# Patient Record
Sex: MALE | Race: WHITE | Employment: OTHER | URBAN - METROPOLITAN AREA
[De-identification: names, ages, dates, MRNs, and addresses within clinical notes are randomized per-mention and may not be internally consistent; named-entity substitution may affect disease eponyms.]

---

## 2018-03-20 ENCOUNTER — HOSPITAL ENCOUNTER (OUTPATIENT)
Age: 58
Discharge: HOME | End: 2018-03-20
Payer: MEDICARE

## 2018-03-20 ENCOUNTER — HOSPITAL ENCOUNTER (OUTPATIENT)
Dept: ULTRASOUND IMAGING | Age: 58
Discharge: HOME | End: 2018-03-20
Payer: MEDICARE

## 2018-03-20 DIAGNOSIS — K74.5 BILIARY CIRRHOSIS (HCC): ICD-10-CM

## 2018-03-20 LAB
ALBUMIN SERPL-MCNC: 4 G/DL (ref 3.5–5.2)
ALP BLD-CCNC: 128 U/L (ref 40–129)
ALT SERPL-CCNC: 26 U/L (ref 0–40)
AMMONIA: 50 UMOL/L (ref 16–60)
ANION GAP SERPL CALCULATED.3IONS-SCNC: 9 MMOL/L (ref 7–16)
AST SERPL-CCNC: 43 U/L (ref 0–39)
BASOPHILS ABSOLUTE: 0.07 E9/L (ref 0–0.2)
BASOPHILS RELATIVE PERCENT: 1 % (ref 0–2)
BILIRUB SERPL-MCNC: 1.3 MG/DL (ref 0–1.2)
BUN BLDV-MCNC: 17 MG/DL (ref 6–20)
CALCIUM SERPL-MCNC: 10.1 MG/DL (ref 8.6–10.2)
CHLORIDE BLD-SCNC: 101 MMOL/L (ref 98–107)
CHOLESTEROL, FASTING: 173 MG/DL (ref 0–199)
CO2: 31 MMOL/L (ref 22–29)
CREAT SERPL-MCNC: 0.8 MG/DL (ref 0.7–1.2)
EOSINOPHILS ABSOLUTE: 0.27 E9/L (ref 0.05–0.5)
EOSINOPHILS RELATIVE PERCENT: 3.9 % (ref 0–6)
FERRITIN: 374 NG/ML
GFR AFRICAN AMERICAN: >60
GFR NON-AFRICAN AMERICAN: >60 ML/MIN/1.73
GLUCOSE BLD-MCNC: 156 MG/DL (ref 74–109)
HBA1C MFR BLD: 6.1 % (ref 4.8–5.9)
HCT VFR BLD CALC: 45.8 % (ref 37–54)
HDLC SERPL-MCNC: 34 MG/DL
HEMOGLOBIN: 15.7 G/DL (ref 12.5–16.5)
IMMATURE GRANULOCYTES #: 0.02 E9/L
IMMATURE GRANULOCYTES %: 0.3 % (ref 0–5)
INR BLD: 1.1
IRON SATURATION: 46 % (ref 20–55)
IRON: 144 MCG/DL (ref 59–158)
LDL CHOLESTEROL CALCULATED: 98 MG/DL (ref 0–99)
LYMPHOCYTES ABSOLUTE: 1.77 E9/L (ref 1.5–4)
LYMPHOCYTES RELATIVE PERCENT: 25.3 % (ref 20–42)
MCH RBC QN AUTO: 35.6 PG (ref 26–35)
MCHC RBC AUTO-ENTMCNC: 34.3 % (ref 32–34.5)
MCV RBC AUTO: 103.9 FL (ref 80–99.9)
MICROALBUMIN UR-MCNC: <12 MG/L
MONOCYTES ABSOLUTE: 0.49 E9/L (ref 0.1–0.95)
MONOCYTES RELATIVE PERCENT: 7 % (ref 2–12)
NEUTROPHILS ABSOLUTE: 4.37 E9/L (ref 1.8–7.3)
NEUTROPHILS RELATIVE PERCENT: 62.5 % (ref 43–80)
PDW BLD-RTO: 13.1 FL (ref 11.5–15)
PLATELET # BLD: 124 E9/L (ref 130–450)
PMV BLD AUTO: 10.2 FL (ref 7–12)
POTASSIUM SERPL-SCNC: 4 MMOL/L (ref 3.5–5)
PROSTATE SPECIFIC ANTIGEN: 0.27 NG/ML (ref 0–4)
PROTHROMBIN TIME: 12.4 SEC (ref 9.3–12.4)
RBC # BLD: 4.41 E12/L (ref 3.8–5.8)
SODIUM BLD-SCNC: 141 MMOL/L (ref 132–146)
TOTAL IRON BINDING CAPACITY: 314 MCG/DL (ref 250–450)
TOTAL PROTEIN: 7.7 G/DL (ref 6.4–8.3)
TRIGLYCERIDE, FASTING: 204 MG/DL (ref 0–149)
VITAMIN B-12: 928 PG/ML (ref 211–946)
VLDLC SERPL CALC-MCNC: 41 MG/DL
WBC # BLD: 7 E9/L (ref 4.5–11.5)

## 2018-03-20 PROCEDURE — 82044 UR ALBUMIN SEMIQUANTITATIVE: CPT

## 2018-03-20 PROCEDURE — 83550 IRON BINDING TEST: CPT

## 2018-03-20 PROCEDURE — 83010 ASSAY OF HAPTOGLOBIN QUANT: CPT

## 2018-03-20 PROCEDURE — 83516 IMMUNOASSAY NONANTIBODY: CPT

## 2018-03-20 PROCEDURE — 82728 ASSAY OF FERRITIN: CPT

## 2018-03-20 PROCEDURE — G0103 PSA SCREENING: HCPCS

## 2018-03-20 PROCEDURE — 83883 ASSAY NEPHELOMETRY NOT SPEC: CPT

## 2018-03-20 PROCEDURE — 36415 COLL VENOUS BLD VENIPUNCTURE: CPT

## 2018-03-20 PROCEDURE — 82172 ASSAY OF APOLIPOPROTEIN: CPT

## 2018-03-20 PROCEDURE — 82140 ASSAY OF AMMONIA: CPT

## 2018-03-20 PROCEDURE — 83540 ASSAY OF IRON: CPT

## 2018-03-20 PROCEDURE — 85610 PROTHROMBIN TIME: CPT

## 2018-03-20 PROCEDURE — 84460 ALANINE AMINO (ALT) (SGPT): CPT

## 2018-03-20 PROCEDURE — 82247 BILIRUBIN TOTAL: CPT

## 2018-03-20 PROCEDURE — 86706 HEP B SURFACE ANTIBODY: CPT

## 2018-03-20 PROCEDURE — 83036 HEMOGLOBIN GLYCOSYLATED A1C: CPT

## 2018-03-20 PROCEDURE — 86803 HEPATITIS C AB TEST: CPT

## 2018-03-20 PROCEDURE — 82390 ASSAY OF CERULOPLASMIN: CPT

## 2018-03-20 PROCEDURE — 76705 ECHO EXAM OF ABDOMEN: CPT

## 2018-03-20 PROCEDURE — 82105 ALPHA-FETOPROTEIN SERUM: CPT

## 2018-03-20 PROCEDURE — 86703 HIV-1/HIV-2 1 RESULT ANTBDY: CPT

## 2018-03-20 PROCEDURE — 86038 ANTINUCLEAR ANTIBODIES: CPT

## 2018-03-20 PROCEDURE — 82607 VITAMIN B-12: CPT

## 2018-03-20 PROCEDURE — 82103 ALPHA-1-ANTITRYPSIN TOTAL: CPT

## 2018-03-20 PROCEDURE — 87340 HEPATITIS B SURFACE AG IA: CPT

## 2018-03-20 PROCEDURE — 86704 HEP B CORE ANTIBODY TOTAL: CPT

## 2018-03-20 PROCEDURE — 86255 FLUORESCENT ANTIBODY SCREEN: CPT

## 2018-03-20 PROCEDURE — 82977 ASSAY OF GGT: CPT

## 2018-03-20 PROCEDURE — 82784 ASSAY IGA/IGD/IGG/IGM EACH: CPT

## 2018-03-20 PROCEDURE — 80053 COMPREHEN METABOLIC PANEL: CPT

## 2018-03-20 PROCEDURE — 80061 LIPID PANEL: CPT

## 2018-03-20 PROCEDURE — 85025 COMPLETE CBC W/AUTO DIFF WBC: CPT

## 2018-03-20 PROCEDURE — 86376 MICROSOMAL ANTIBODY EACH: CPT

## 2018-03-21 LAB
ANTI-MITOCHONDRIAL AB, IFA: NEGATIVE
ANTI-NUCLEAR ANTIBODY (ANA): NEGATIVE
HBV SURFACE AB TITR SER: NORMAL {TITER}
HEPATITIS B SURFACE ANTIGEN INTERPRETATION: NORMAL
HEPATITIS C ANTIBODY INTERPRETATION: NORMAL
HIV-1 AND HIV-2 ANTIBODIES: NORMAL
IGA: 294 MG/DL (ref 70–400)
SMOOTH MUSCLE ANTIBODY: NEGATIVE

## 2018-03-22 ENCOUNTER — HOSPITAL ENCOUNTER (OUTPATIENT)
Age: 58
Setting detail: SPECIMEN
Discharge: HOME | End: 2018-03-22
Payer: MEDICARE

## 2018-03-22 LAB
APTT: 32.2 SEC (ref 24.5–35.1)
CERULOPLASMIN: 26 MG/DL (ref 15–30)
HEPATITIS B CORE TOTAL ANTIBODY: NONREACTIVE

## 2018-03-22 PROCEDURE — 85730 THROMBOPLASTIN TIME PARTIAL: CPT

## 2018-03-22 PROCEDURE — 36415 COLL VENOUS BLD VENIPUNCTURE: CPT

## 2018-03-23 LAB
AFP-TUMOR MARKER: 5 NG/ML (ref 0–9)
ALPHA-1 ANTITRYPSIN: 181 MG/DL (ref 90–200)

## 2018-03-24 LAB
GLIADIN ANTIBODIES IGA: 7 UNITS (ref 0–19)
GLIADIN ANTIBODIES IGG: 4 UNITS (ref 0–19)
TISSUE TRANSGLUTAMINASE ANTIBODY: 1 U/ML (ref 0–5)
TISSUE TRANSGLUTAMINASE IGA: 1 U/ML (ref 0–3)

## 2018-03-25 LAB — LIVER-KIDNEY MICROSOME-1 AB IGG: 1.1 U (ref 0–24.9)

## 2018-03-27 LAB
Lab: NORMAL
REPORT: NORMAL
THIS TEST SENT TO: NORMAL

## 2018-08-15 ENCOUNTER — HOSPITAL ENCOUNTER (OUTPATIENT)
Age: 58
Discharge: HOME | End: 2018-08-15
Payer: MEDICARE

## 2018-08-15 LAB
ANION GAP SERPL CALCULATED.3IONS-SCNC: 10 MMOL/L (ref 7–16)
BUN BLDV-MCNC: 15 MG/DL (ref 6–20)
CALCIUM SERPL-MCNC: 9.9 MG/DL (ref 8.6–10.2)
CHLORIDE BLD-SCNC: 101 MMOL/L (ref 98–107)
CO2: 27 MMOL/L (ref 22–29)
CREAT SERPL-MCNC: 0.8 MG/DL (ref 0.7–1.2)
GFR AFRICAN AMERICAN: >60
GFR NON-AFRICAN AMERICAN: >60 ML/MIN/1.73
GLUCOSE FASTING: 151 MG/DL (ref 74–109)
HBA1C MFR BLD: 6.2 % (ref 4–5.6)
POTASSIUM SERPL-SCNC: 4 MMOL/L (ref 3.5–5)
SODIUM BLD-SCNC: 138 MMOL/L (ref 132–146)

## 2018-08-15 PROCEDURE — 36415 COLL VENOUS BLD VENIPUNCTURE: CPT

## 2018-08-15 PROCEDURE — 83036 HEMOGLOBIN GLYCOSYLATED A1C: CPT

## 2018-08-15 PROCEDURE — 80048 BASIC METABOLIC PNL TOTAL CA: CPT

## 2018-08-26 ENCOUNTER — HOSPITAL ENCOUNTER (EMERGENCY)
Age: 58
Discharge: HOME | End: 2018-08-26
Payer: MEDICARE

## 2018-08-26 ENCOUNTER — APPOINTMENT (OUTPATIENT)
Dept: GENERAL RADIOLOGY | Age: 58
End: 2018-08-26
Payer: MEDICARE

## 2018-08-26 VITALS
HEIGHT: 71 IN | RESPIRATION RATE: 18 BRPM | BODY MASS INDEX: 33.04 KG/M2 | DIASTOLIC BLOOD PRESSURE: 76 MMHG | HEART RATE: 70 BPM | OXYGEN SATURATION: 97 % | TEMPERATURE: 97.9 F | WEIGHT: 236 LBS | SYSTOLIC BLOOD PRESSURE: 129 MMHG

## 2018-08-26 DIAGNOSIS — S80.11XA CONTUSION OF RIGHT LOWER EXTREMITY, INITIAL ENCOUNTER: ICD-10-CM

## 2018-08-26 DIAGNOSIS — S93.401A SPRAIN OF RIGHT ANKLE, UNSPECIFIED LIGAMENT, INITIAL ENCOUNTER: Primary | ICD-10-CM

## 2018-08-26 PROCEDURE — 73610 X-RAY EXAM OF ANKLE: CPT

## 2018-08-26 PROCEDURE — 99284 EMERGENCY DEPT VISIT MOD MDM: CPT

## 2018-08-26 PROCEDURE — 73590 X-RAY EXAM OF LOWER LEG: CPT

## 2018-08-26 ASSESSMENT — PAIN DESCRIPTION - ORIENTATION: ORIENTATION: RIGHT

## 2018-08-26 ASSESSMENT — PAIN DESCRIPTION - LOCATION: LOCATION: LEG

## 2018-08-26 ASSESSMENT — PAIN SCALES - GENERAL: PAINLEVEL_OUTOF10: 5

## 2018-08-26 ASSESSMENT — PAIN DESCRIPTION - PAIN TYPE: TYPE: ACUTE PAIN

## 2018-08-26 NOTE — ED PROVIDER NOTES
[facility]         Department of Emergency Medicine   ED  Provider Note  Admit Date/RoomTime: 8/26/2018  4:13 PM  ED Room: [place] [address]  Chief Complaint   Fall (Patient fell 2 days ago hurting his right leg.)    History of Present Illness   Source of history provided by:  patient. History/Exam Limitations: none. Jay Skinner is a 60 y.o. old male who has a past medical history of:   Past Medical History:   Diagnosis Date    Chronic back pain     Cirrhosis, non-alcoholic ([address]     COPD (chronic obstructive pulmonary disease) (HCC)     Emphysema of lung (HCC)     Headache     5 per month, 4/10 on the pain scale    History of blood disorder     Migraine     1 per month, 7/10 on the pain scale    Neck pain     Neuropathy     Numbness and tingling     feet and hands    Pain in both lower legs     Shaking     Stroke (cerebrum) ([address] 1997    presents to the emergency department by private vehicle, for right leg and ankle pain. Patient states he tripped and fell into the right leg two days ago. He denies head injury. No LOC. He is not on any blood thinners. Patient reports that he rolled his right ankle and has mid lower leg pain. Increased pain w/weight bearing. Nothing makes it better. ROS    Pertinent positives and negatives are stated within HPI, all other systems reviewed and are negative. Past Surgical History:   Procedure Laterality Date    [facility]   Social History:  reports that he has been smoking. He has been smoking about 0.50 packs per day. He has never used smokeless tobacco. He reports that he does not drink alcohol or use drugs. Family History: family history includes Alzheimer's Disease in his mother; Arthritis in his father; Breast Cancer in his mother; Diabetes in his father; Heart Disease in his father.    Allergies: Chocolate; Grass extracts [gramineae pollens]; and Carafate [sucralfate]    Physical Exam           ED care and counseling regarding the diagnosis and prognosis. Questions are answered at this time and they are agreeable with the plan. Assessment      1. Sprain of right ankle, unspecified ligament, initial encounter    2. Contusion of right lower extremity, initial encounter      Plan   Discharge to home  Patient condition is good    New Medications     New Prescriptions    No medications on file     Electronically signed by FRANCISCO Manrique   DD: 8/26/18  **This report was transcribed using voice recognition software. Every effort was made to ensure accuracy; however, inadvertent computerized transcription errors may be present. END OF ED PROVIDER NOTE       Jack Manrique  08/26/18 1734    ATTENDING PROVIDER ATTESTATION:     Supervising Physician, on-site, available for consultation, non-participatory in the evaluation or care of this patient.          Jose G Deluca DO  08/26/18 4147

## 2018-10-05 ENCOUNTER — HOSPITAL ENCOUNTER (OUTPATIENT)
Age: 58
Discharge: HOME | End: 2018-10-07
Payer: MEDICARE

## 2018-10-05 LAB
ALBUMIN SERPL-MCNC: 3.7 G/DL (ref 3.5–5.2)
ALP BLD-CCNC: 178 U/L (ref 40–129)
ALT SERPL-CCNC: 23 U/L (ref 0–40)
ANION GAP SERPL CALCULATED.3IONS-SCNC: 10 MMOL/L (ref 7–16)
APTT: 33.7 SEC (ref 24.5–35.1)
AST SERPL-CCNC: 43 U/L (ref 0–39)
BASOPHILS ABSOLUTE: 0.07 E9/L (ref 0–0.2)
BASOPHILS RELATIVE PERCENT: 1.1 % (ref 0–2)
BILIRUB SERPL-MCNC: 1 MG/DL (ref 0–1.2)
BUN BLDV-MCNC: 12 MG/DL (ref 6–20)
CALCIUM SERPL-MCNC: 9.5 MG/DL (ref 8.6–10.2)
CHLORIDE BLD-SCNC: 106 MMOL/L (ref 98–107)
CHOLESTEROL, TOTAL: 157 MG/DL (ref 0–199)
CO2: 26 MMOL/L (ref 22–29)
CREAT SERPL-MCNC: 0.7 MG/DL (ref 0.7–1.2)
EOSINOPHILS ABSOLUTE: 0.3 E9/L (ref 0.05–0.5)
EOSINOPHILS RELATIVE PERCENT: 4.5 % (ref 0–6)
GFR AFRICAN AMERICAN: >60
GFR NON-AFRICAN AMERICAN: >60 ML/MIN/1.73
GLUCOSE BLD-MCNC: 167 MG/DL (ref 74–109)
HBA1C MFR BLD: 6.1 % (ref 4–5.6)
HCT VFR BLD CALC: 43.5 % (ref 37–54)
HDLC SERPL-MCNC: 56 MG/DL
HEMOGLOBIN: 14.2 G/DL (ref 12.5–16.5)
IMMATURE GRANULOCYTES #: 0.02 E9/L
IMMATURE GRANULOCYTES %: 0.3 % (ref 0–5)
INR BLD: 1.1
LDL CHOLESTEROL CALCULATED: 77 MG/DL (ref 0–99)
LYMPHOCYTES ABSOLUTE: 1.13 E9/L (ref 1.5–4)
LYMPHOCYTES RELATIVE PERCENT: 17 % (ref 20–42)
MCH RBC QN AUTO: 34.5 PG (ref 26–35)
MCHC RBC AUTO-ENTMCNC: 32.6 % (ref 32–34.5)
MCV RBC AUTO: 105.6 FL (ref 80–99.9)
MICROALBUMIN UR-MCNC: <12 MG/L
MONOCYTES ABSOLUTE: 0.52 E9/L (ref 0.1–0.95)
MONOCYTES RELATIVE PERCENT: 7.8 % (ref 2–12)
NEUTROPHILS ABSOLUTE: 4.59 E9/L (ref 1.8–7.3)
NEUTROPHILS RELATIVE PERCENT: 69.3 % (ref 43–80)
PDW BLD-RTO: 13.5 FL (ref 11.5–15)
PLATELET # BLD: 122 E9/L (ref 130–450)
PMV BLD AUTO: 10.9 FL (ref 7–12)
POTASSIUM SERPL-SCNC: 4.3 MMOL/L (ref 3.5–5)
PROTHROMBIN TIME: 12.2 SEC (ref 9.3–12.4)
RBC # BLD: 4.12 E12/L (ref 3.8–5.8)
SODIUM BLD-SCNC: 142 MMOL/L (ref 132–146)
TOTAL PROTEIN: 6.9 G/DL (ref 6.4–8.3)
TRIGL SERPL-MCNC: 121 MG/DL (ref 0–149)
VLDLC SERPL CALC-MCNC: 24 MG/DL
WBC # BLD: 6.6 E9/L (ref 4.5–11.5)

## 2018-10-05 PROCEDURE — 82044 UR ALBUMIN SEMIQUANTITATIVE: CPT

## 2018-10-05 PROCEDURE — 82105 ALPHA-FETOPROTEIN SERUM: CPT

## 2018-10-05 PROCEDURE — 85730 THROMBOPLASTIN TIME PARTIAL: CPT

## 2018-10-05 PROCEDURE — 36415 COLL VENOUS BLD VENIPUNCTURE: CPT

## 2018-10-05 PROCEDURE — 80053 COMPREHEN METABOLIC PANEL: CPT

## 2018-10-05 PROCEDURE — 85025 COMPLETE CBC W/AUTO DIFF WBC: CPT

## 2018-10-05 PROCEDURE — 80061 LIPID PANEL: CPT

## 2018-10-05 PROCEDURE — 85610 PROTHROMBIN TIME: CPT

## 2018-10-05 PROCEDURE — 83036 HEMOGLOBIN GLYCOSYLATED A1C: CPT

## 2018-10-08 LAB — AFP-TUMOR MARKER: 4 NG/ML (ref 0–9)

## 2018-10-25 ENCOUNTER — HOSPITAL ENCOUNTER (OUTPATIENT)
Age: 58
Discharge: HOME | End: 2018-10-27
Payer: MEDICARE

## 2018-10-25 LAB
FOLATE: 16.4 NG/ML (ref 4.8–24.2)
TSH SERPL DL<=0.05 MIU/L-ACNC: 1.71 UIU/ML (ref 0.27–4.2)
VITAMIN B-12: 1155 PG/ML (ref 211–946)

## 2018-10-25 PROCEDURE — 82607 VITAMIN B-12: CPT

## 2018-10-25 PROCEDURE — 84443 ASSAY THYROID STIM HORMONE: CPT

## 2018-10-25 PROCEDURE — 36415 COLL VENOUS BLD VENIPUNCTURE: CPT

## 2018-10-25 PROCEDURE — 82746 ASSAY OF FOLIC ACID SERUM: CPT

## 2018-12-05 ENCOUNTER — HOSPITAL ENCOUNTER (OUTPATIENT)
Age: 58
Discharge: HOME | End: 2018-12-07
Payer: MEDICARE

## 2018-12-05 LAB
ALBUMIN SERPL-MCNC: 3.5 G/DL (ref 3.5–5.2)
ALP BLD-CCNC: 144 U/L (ref 40–129)
ALT SERPL-CCNC: 22 U/L (ref 0–40)
ANION GAP SERPL CALCULATED.3IONS-SCNC: 13 MMOL/L (ref 7–16)
AST SERPL-CCNC: 40 U/L (ref 0–39)
BILIRUB SERPL-MCNC: 1.5 MG/DL (ref 0–1.2)
BUN BLDV-MCNC: 11 MG/DL (ref 6–20)
CALCIUM SERPL-MCNC: 9.6 MG/DL (ref 8.6–10.2)
CHLORIDE BLD-SCNC: 100 MMOL/L (ref 98–107)
CO2: 27 MMOL/L (ref 22–29)
CREAT SERPL-MCNC: 0.9 MG/DL (ref 0.7–1.2)
GFR AFRICAN AMERICAN: >60
GFR NON-AFRICAN AMERICAN: >60 ML/MIN/1.73
GLUCOSE BLD-MCNC: 246 MG/DL (ref 74–99)
HCT VFR BLD CALC: 42.7 % (ref 37–54)
HEMOGLOBIN: 14.3 G/DL (ref 12.5–16.5)
MCH RBC QN AUTO: 35 PG (ref 26–35)
MCHC RBC AUTO-ENTMCNC: 33.5 % (ref 32–34.5)
MCV RBC AUTO: 104.4 FL (ref 80–99.9)
PDW BLD-RTO: 13.6 FL (ref 11.5–15)
PLATELET # BLD: 110 E9/L (ref 130–450)
PMV BLD AUTO: 11.3 FL (ref 7–12)
POTASSIUM SERPL-SCNC: 4 MMOL/L (ref 3.5–5)
RBC # BLD: 4.09 E12/L (ref 3.8–5.8)
SODIUM BLD-SCNC: 140 MMOL/L (ref 132–146)
TOTAL PROTEIN: 6.8 G/DL (ref 6.4–8.3)
WBC # BLD: 5.3 E9/L (ref 4.5–11.5)

## 2018-12-05 PROCEDURE — 85027 COMPLETE CBC AUTOMATED: CPT

## 2018-12-05 PROCEDURE — 80053 COMPREHEN METABOLIC PANEL: CPT

## 2018-12-05 PROCEDURE — 36415 COLL VENOUS BLD VENIPUNCTURE: CPT

## 2019-03-22 ENCOUNTER — HOSPITAL ENCOUNTER (OUTPATIENT)
Age: 59
Discharge: HOME | End: 2019-03-24
Payer: MEDICARE

## 2019-03-22 LAB
ALBUMIN SERPL-MCNC: 4 G/DL (ref 3.5–5.2)
ALP BLD-CCNC: 119 U/L (ref 40–129)
ALT SERPL-CCNC: 18 U/L (ref 0–40)
ANION GAP SERPL CALCULATED.3IONS-SCNC: 12 MMOL/L (ref 7–16)
APTT: 34 SEC (ref 24.5–35.1)
AST SERPL-CCNC: 41 U/L (ref 0–39)
BASOPHILS ABSOLUTE: 0.08 E9/L (ref 0–0.2)
BASOPHILS RELATIVE PERCENT: 1 % (ref 0–2)
BILIRUB SERPL-MCNC: 1.7 MG/DL (ref 0–1.2)
BUN BLDV-MCNC: 14 MG/DL (ref 6–20)
CALCIUM SERPL-MCNC: 9.9 MG/DL (ref 8.6–10.2)
CHLORIDE BLD-SCNC: 101 MMOL/L (ref 98–107)
CHOLESTEROL, TOTAL: 163 MG/DL (ref 0–199)
CO2: 27 MMOL/L (ref 22–29)
CREAT SERPL-MCNC: 0.9 MG/DL (ref 0.7–1.2)
EOSINOPHILS ABSOLUTE: 0.26 E9/L (ref 0.05–0.5)
EOSINOPHILS RELATIVE PERCENT: 3.2 % (ref 0–6)
GFR AFRICAN AMERICAN: >60
GFR NON-AFRICAN AMERICAN: >60 ML/MIN/1.73
GLUCOSE BLD-MCNC: 147 MG/DL (ref 74–99)
HBA1C MFR BLD: 6.2 % (ref 4–5.6)
HCT VFR BLD CALC: 43.6 % (ref 37–54)
HDLC SERPL-MCNC: 41 MG/DL
HEMOGLOBIN: 14.6 G/DL (ref 12.5–16.5)
IMMATURE GRANULOCYTES #: 0.03 E9/L
IMMATURE GRANULOCYTES %: 0.4 % (ref 0–5)
INR BLD: 1.1
LDL CHOLESTEROL CALCULATED: 91 MG/DL (ref 0–99)
LYMPHOCYTES ABSOLUTE: 1.31 E9/L (ref 1.5–4)
LYMPHOCYTES RELATIVE PERCENT: 16.2 % (ref 20–42)
MCH RBC QN AUTO: 35.1 PG (ref 26–35)
MCHC RBC AUTO-ENTMCNC: 33.5 % (ref 32–34.5)
MCV RBC AUTO: 104.8 FL (ref 80–99.9)
MICROALBUMIN UR-MCNC: <12 MG/L
MONOCYTES ABSOLUTE: 0.53 E9/L (ref 0.1–0.95)
MONOCYTES RELATIVE PERCENT: 6.6 % (ref 2–12)
NEUTROPHILS ABSOLUTE: 5.88 E9/L (ref 1.8–7.3)
NEUTROPHILS RELATIVE PERCENT: 72.6 % (ref 43–80)
PDW BLD-RTO: 13.7 FL (ref 11.5–15)
PLATELET # BLD: 107 E9/L (ref 130–450)
PMV BLD AUTO: 10.7 FL (ref 7–12)
POTASSIUM SERPL-SCNC: 4.2 MMOL/L (ref 3.5–5)
PROTHROMBIN TIME: 12.5 SEC (ref 9.3–12.4)
RBC # BLD: 4.16 E12/L (ref 3.8–5.8)
SODIUM BLD-SCNC: 140 MMOL/L (ref 132–146)
TOTAL PROTEIN: 7.3 G/DL (ref 6.4–8.3)
TRIGL SERPL-MCNC: 156 MG/DL (ref 0–149)
VLDLC SERPL CALC-MCNC: 31 MG/DL
WBC # BLD: 8.1 E9/L (ref 4.5–11.5)

## 2019-03-22 PROCEDURE — 36415 COLL VENOUS BLD VENIPUNCTURE: CPT

## 2019-03-22 PROCEDURE — 85730 THROMBOPLASTIN TIME PARTIAL: CPT

## 2019-03-22 PROCEDURE — 85025 COMPLETE CBC W/AUTO DIFF WBC: CPT

## 2019-03-22 PROCEDURE — 83036 HEMOGLOBIN GLYCOSYLATED A1C: CPT

## 2019-03-22 PROCEDURE — 82044 UR ALBUMIN SEMIQUANTITATIVE: CPT

## 2019-03-22 PROCEDURE — 80053 COMPREHEN METABOLIC PANEL: CPT

## 2019-03-22 PROCEDURE — 85610 PROTHROMBIN TIME: CPT

## 2019-03-22 PROCEDURE — 80061 LIPID PANEL: CPT

## 2019-04-03 ENCOUNTER — HOSPITAL ENCOUNTER (OUTPATIENT)
Age: 59
Discharge: HOME | End: 2019-04-05
Payer: MEDICARE

## 2019-04-10 ENCOUNTER — HOSPITAL ENCOUNTER (OUTPATIENT)
Age: 59
Discharge: HOME | End: 2019-04-12
Payer: MEDICARE

## 2019-04-10 PROCEDURE — 89055 LEUKOCYTE ASSESSMENT FECAL: CPT

## 2019-04-10 PROCEDURE — 87329 GIARDIA AG IA: CPT

## 2019-04-10 PROCEDURE — 87328 CRYPTOSPORIDIUM AG IA: CPT

## 2019-04-10 PROCEDURE — 87045 FECES CULTURE AEROBIC BACT: CPT

## 2019-04-10 PROCEDURE — 83520 IMMUNOASSAY QUANT NOS NONAB: CPT

## 2019-04-12 LAB
CRYPTOSPORIDIUM ANTIGEN STOOL: NORMAL
GIARDIA ANTIGEN STOOL: NORMAL
WHITE BLOOD CELLS (WBC), STOOL: NORMAL

## 2019-04-13 LAB — CULTURE, STOOL: NORMAL

## 2019-04-17 LAB
Lab: NORMAL
REPORT: NORMAL
THIS TEST SENT TO: NORMAL

## 2019-05-10 ENCOUNTER — HOSPITAL ENCOUNTER (OUTPATIENT)
Age: 59
Discharge: HOME | End: 2019-05-12
Payer: MEDICARE

## 2019-05-10 LAB
ALBUMIN SERPL-MCNC: 4 G/DL (ref 3.5–5.2)
ALP BLD-CCNC: 129 U/L (ref 40–129)
ALT SERPL-CCNC: 21 U/L (ref 0–40)
ANION GAP SERPL CALCULATED.3IONS-SCNC: 13 MMOL/L (ref 7–16)
AST SERPL-CCNC: 39 U/L (ref 0–39)
BILIRUB SERPL-MCNC: 1.1 MG/DL (ref 0–1.2)
BUN BLDV-MCNC: 11 MG/DL (ref 6–20)
CALCIUM SERPL-MCNC: 9.9 MG/DL (ref 8.6–10.2)
CHLORIDE BLD-SCNC: 104 MMOL/L (ref 98–107)
CO2: 27 MMOL/L (ref 22–29)
CREAT SERPL-MCNC: 0.9 MG/DL (ref 0.7–1.2)
GFR AFRICAN AMERICAN: >60
GFR NON-AFRICAN AMERICAN: >60 ML/MIN/1.73
GLUCOSE BLD-MCNC: 149 MG/DL (ref 74–99)
POTASSIUM SERPL-SCNC: 4.7 MMOL/L (ref 3.5–5)
SODIUM BLD-SCNC: 144 MMOL/L (ref 132–146)
TOTAL PROTEIN: 7.2 G/DL (ref 6.4–8.3)

## 2019-05-10 PROCEDURE — 36415 COLL VENOUS BLD VENIPUNCTURE: CPT

## 2019-05-10 PROCEDURE — 80053 COMPREHEN METABOLIC PANEL: CPT

## 2019-08-29 ENCOUNTER — HOSPITAL ENCOUNTER (OUTPATIENT)
Age: 59
Discharge: HOME | End: 2019-08-31
Payer: MEDICARE

## 2019-08-29 LAB
ALBUMIN SERPL-MCNC: 3.7 G/DL (ref 3.5–5.2)
ALP BLD-CCNC: 136 U/L (ref 40–129)
ALT SERPL-CCNC: 23 U/L (ref 0–40)
AMMONIA: 33 UMOL/L (ref 16–60)
AMYLASE: 71 U/L (ref 20–100)
ANION GAP SERPL CALCULATED.3IONS-SCNC: 11 MMOL/L (ref 7–16)
APTT: 35.2 SEC (ref 24.5–35.1)
AST SERPL-CCNC: 52 U/L (ref 0–39)
BASOPHILS ABSOLUTE: 0.09 E9/L (ref 0–0.2)
BASOPHILS RELATIVE PERCENT: 1.5 % (ref 0–2)
BILIRUB SERPL-MCNC: 0.9 MG/DL (ref 0–1.2)
BUN BLDV-MCNC: 12 MG/DL (ref 8–23)
CALCIUM SERPL-MCNC: 9.5 MG/DL (ref 8.6–10.2)
CHLORIDE BLD-SCNC: 103 MMOL/L (ref 98–107)
CO2: 27 MMOL/L (ref 22–29)
CREAT SERPL-MCNC: 1 MG/DL (ref 0.7–1.2)
EOSINOPHILS ABSOLUTE: 0.24 E9/L (ref 0.05–0.5)
EOSINOPHILS RELATIVE PERCENT: 4.1 % (ref 0–6)
GFR AFRICAN AMERICAN: >60
GFR NON-AFRICAN AMERICAN: >60 ML/MIN/1.73
GLUCOSE BLD-MCNC: 115 MG/DL (ref 74–99)
HBA1C MFR BLD: 5.6 % (ref 4–5.6)
HCT VFR BLD CALC: 43.9 % (ref 37–54)
HEMOGLOBIN: 14.2 G/DL (ref 12.5–16.5)
IMMATURE GRANULOCYTES #: 0.01 E9/L
IMMATURE GRANULOCYTES %: 0.2 % (ref 0–5)
INR BLD: 1.1
LIPASE: 35 U/L (ref 13–60)
LYMPHOCYTES ABSOLUTE: 1.14 E9/L (ref 1.5–4)
LYMPHOCYTES RELATIVE PERCENT: 19.6 % (ref 20–42)
MCH RBC QN AUTO: 34.7 PG (ref 26–35)
MCHC RBC AUTO-ENTMCNC: 32.3 % (ref 32–34.5)
MCV RBC AUTO: 107.3 FL (ref 80–99.9)
MONOCYTES ABSOLUTE: 0.59 E9/L (ref 0.1–0.95)
MONOCYTES RELATIVE PERCENT: 10.2 % (ref 2–12)
NEUTROPHILS ABSOLUTE: 3.74 E9/L (ref 1.8–7.3)
NEUTROPHILS RELATIVE PERCENT: 64.4 % (ref 43–80)
PDW BLD-RTO: 13.5 FL (ref 11.5–15)
PLATELET # BLD: 113 E9/L (ref 130–450)
PMV BLD AUTO: 10.1 FL (ref 7–12)
POTASSIUM SERPL-SCNC: 4.5 MMOL/L (ref 3.5–5)
PROTHROMBIN TIME: 12.4 SEC (ref 9.3–12.4)
RBC # BLD: 4.09 E12/L (ref 3.8–5.8)
SODIUM BLD-SCNC: 141 MMOL/L (ref 132–146)
T4 FREE: 0.89 NG/DL (ref 0.93–1.7)
TOTAL PROTEIN: 6.9 G/DL (ref 6.4–8.3)
TSH SERPL DL<=0.05 MIU/L-ACNC: 1.47 UIU/ML (ref 0.27–4.2)
WBC # BLD: 5.8 E9/L (ref 4.5–11.5)

## 2019-08-29 PROCEDURE — 36415 COLL VENOUS BLD VENIPUNCTURE: CPT

## 2019-08-29 PROCEDURE — 85025 COMPLETE CBC W/AUTO DIFF WBC: CPT

## 2019-08-29 PROCEDURE — 85730 THROMBOPLASTIN TIME PARTIAL: CPT

## 2019-08-29 PROCEDURE — 83036 HEMOGLOBIN GLYCOSYLATED A1C: CPT

## 2019-08-29 PROCEDURE — 83690 ASSAY OF LIPASE: CPT

## 2019-08-29 PROCEDURE — 85610 PROTHROMBIN TIME: CPT

## 2019-08-29 PROCEDURE — 82140 ASSAY OF AMMONIA: CPT

## 2019-08-29 PROCEDURE — 82150 ASSAY OF AMYLASE: CPT

## 2019-08-29 PROCEDURE — 80053 COMPREHEN METABOLIC PANEL: CPT

## 2019-08-29 PROCEDURE — 84439 ASSAY OF FREE THYROXINE: CPT

## 2019-08-29 PROCEDURE — 84443 ASSAY THYROID STIM HORMONE: CPT

## 2019-08-29 PROCEDURE — 82105 ALPHA-FETOPROTEIN SERUM: CPT

## 2019-08-31 LAB — AFP-TUMOR MARKER: 5 NG/ML (ref 0–9)

## 2019-12-05 ENCOUNTER — HOSPITAL ENCOUNTER (OUTPATIENT)
Age: 59
Discharge: HOME | End: 2019-12-07
Payer: MEDICARE

## 2019-12-05 LAB
ALBUMIN SERPL-MCNC: 3.5 G/DL (ref 3.5–5.2)
ALP BLD-CCNC: 153 U/L (ref 40–129)
ALT SERPL-CCNC: 27 U/L (ref 0–40)
ANION GAP SERPL CALCULATED.3IONS-SCNC: 16 MMOL/L (ref 7–16)
AST SERPL-CCNC: 52 U/L (ref 0–39)
BILIRUB SERPL-MCNC: 1.7 MG/DL (ref 0–1.2)
BUN BLDV-MCNC: 11 MG/DL (ref 8–23)
CALCIUM SERPL-MCNC: 9.4 MG/DL (ref 8.6–10.2)
CHLORIDE BLD-SCNC: 106 MMOL/L (ref 98–107)
CHOLESTEROL, TOTAL: 192 MG/DL (ref 0–199)
CO2: 21 MMOL/L (ref 22–29)
CREAT SERPL-MCNC: 1 MG/DL (ref 0.7–1.2)
GFR AFRICAN AMERICAN: >60
GFR NON-AFRICAN AMERICAN: >60 ML/MIN/1.73
GLUCOSE BLD-MCNC: 118 MG/DL (ref 74–99)
HBA1C MFR BLD: 5.7 % (ref 4–5.6)
HDLC SERPL-MCNC: 53 MG/DL
LDL CHOLESTEROL CALCULATED: 117 MG/DL (ref 0–99)
POTASSIUM SERPL-SCNC: 4.5 MMOL/L (ref 3.5–5)
PROSTATE SPECIFIC ANTIGEN: 0.18 NG/ML (ref 0–4)
SODIUM BLD-SCNC: 143 MMOL/L (ref 132–146)
TOTAL PROTEIN: 7 G/DL (ref 6.4–8.3)
TRIGL SERPL-MCNC: 108 MG/DL (ref 0–149)
VLDLC SERPL CALC-MCNC: 22 MG/DL

## 2019-12-05 PROCEDURE — 80061 LIPID PANEL: CPT

## 2019-12-05 PROCEDURE — G0103 PSA SCREENING: HCPCS

## 2019-12-05 PROCEDURE — 36415 COLL VENOUS BLD VENIPUNCTURE: CPT

## 2019-12-05 PROCEDURE — 83036 HEMOGLOBIN GLYCOSYLATED A1C: CPT

## 2019-12-05 PROCEDURE — 80053 COMPREHEN METABOLIC PANEL: CPT

## 2020-01-01 LAB
ALBUMIN SERPL-MCNC: 3.2 G/DL (ref 3.5–5.2)
ALP BLD-CCNC: 199 U/L (ref 40–129)
ALT SERPL-CCNC: 32 U/L (ref 0–40)
ANION GAP SERPL CALCULATED.3IONS-SCNC: 14 MMOL/L (ref 7–16)
ANISOCYTOSIS: ABNORMAL
AST SERPL-CCNC: 66 U/L (ref 0–39)
BASOPHILS ABSOLUTE: 0 E9/L (ref 0–0.2)
BASOPHILS RELATIVE PERCENT: 1 % (ref 0–2)
BILIRUB SERPL-MCNC: 1.8 MG/DL (ref 0–1.2)
BUN BLDV-MCNC: 19 MG/DL (ref 8–23)
CALCIUM SERPL-MCNC: 9.7 MG/DL (ref 8.6–10.2)
CHLORIDE BLD-SCNC: 105 MMOL/L (ref 98–107)
CHOLESTEROL, FASTING: 179 MG/DL (ref 0–199)
CO2: 26 MMOL/L (ref 22–29)
CREAT SERPL-MCNC: 1.2 MG/DL (ref 0.7–1.2)
EOSINOPHILS ABSOLUTE: 0.31 E9/L (ref 0.05–0.5)
EOSINOPHILS RELATIVE PERCENT: 4.3 % (ref 0–6)
FOLATE: 7.1 NG/ML (ref 4.8–24.2)
GFR AFRICAN AMERICAN: >60
GFR NON-AFRICAN AMERICAN: >60 ML/MIN/1.73
GLUCOSE BLD-MCNC: 136 MG/DL (ref 74–99)
HBA1C MFR BLD: 6.2 % (ref 4–5.6)
HCT VFR BLD CALC: 39.6 % (ref 37–54)
HDLC SERPL-MCNC: 46 MG/DL
HEMOGLOBIN: 13.2 G/DL (ref 12.5–16.5)
LDL CHOLESTEROL CALCULATED: 107 MG/DL (ref 0–99)
LYMPHOCYTES ABSOLUTE: 0.71 E9/L (ref 1.5–4)
LYMPHOCYTES RELATIVE PERCENT: 10.4 % (ref 20–42)
MCH RBC QN AUTO: 37.1 PG (ref 26–35)
MCHC RBC AUTO-ENTMCNC: 33.3 % (ref 32–34.5)
MCV RBC AUTO: 111.2 FL (ref 80–99.9)
MICROALBUMIN UR-MCNC: 16.7 MG/L
MONOCYTES ABSOLUTE: 0.5 E9/L (ref 0.1–0.95)
MONOCYTES RELATIVE PERCENT: 7 % (ref 2–12)
NEUTROPHILS ABSOLUTE: 5.54 E9/L (ref 1.8–7.3)
NEUTROPHILS RELATIVE PERCENT: 78.3 % (ref 43–80)
NUCLEATED RED BLOOD CELLS: 0.9 /100 WBC
PDW BLD-RTO: 14.1 FL (ref 11.5–15)
PLATELET # BLD: 96 E9/L (ref 130–450)
PLATELET CONFIRMATION: NORMAL
PMV BLD AUTO: 11.5 FL (ref 7–12)
POTASSIUM SERPL-SCNC: 4.6 MMOL/L (ref 3.5–5)
RBC # BLD: 3.56 E12/L (ref 3.8–5.8)
RPR: NORMAL
SEDIMENTATION RATE, ERYTHROCYTE: 50 MM/HR (ref 0–15)
SODIUM BLD-SCNC: 145 MMOL/L (ref 132–146)
TOTAL PROTEIN: 6.6 G/DL (ref 6.4–8.3)
TRIGLYCERIDE, FASTING: 130 MG/DL (ref 0–149)
TSH SERPL DL<=0.05 MIU/L-ACNC: 1.95 UIU/ML (ref 0.27–4.2)
VITAMIN B-12: 1261 PG/ML (ref 211–946)
VLDLC SERPL CALC-MCNC: 26 MG/DL
WBC # BLD: 7.1 E9/L (ref 4.5–11.5)

## 2020-03-23 ENCOUNTER — HOSPITAL ENCOUNTER (EMERGENCY)
Age: 60
Discharge: HOME | End: 2020-03-23
Payer: MEDICARE

## 2020-03-23 ENCOUNTER — APPOINTMENT (OUTPATIENT)
Dept: GENERAL RADIOLOGY | Age: 60
End: 2020-03-23
Payer: MEDICARE

## 2020-03-23 VITALS
HEIGHT: 72 IN | WEIGHT: 240 LBS | SYSTOLIC BLOOD PRESSURE: 127 MMHG | RESPIRATION RATE: 16 BRPM | OXYGEN SATURATION: 96 % | DIASTOLIC BLOOD PRESSURE: 73 MMHG | TEMPERATURE: 98.9 F | HEART RATE: 84 BPM | BODY MASS INDEX: 32.51 KG/M2

## 2020-03-23 PROCEDURE — 71046 X-RAY EXAM CHEST 2 VIEWS: CPT

## 2020-03-23 PROCEDURE — 6370000000 HC RX 637 (ALT 250 FOR IP): Performed by: NURSE PRACTITIONER

## 2020-03-23 PROCEDURE — 99284 EMERGENCY DEPT VISIT MOD MDM: CPT

## 2020-03-23 RX ORDER — ALBUTEROL SULFATE 90 UG/1
2 AEROSOL, METERED RESPIRATORY (INHALATION) ONCE
Status: COMPLETED | OUTPATIENT
Start: 2020-03-23 | End: 2020-03-23

## 2020-03-23 RX ORDER — GUAIFENESIN/DEXTROMETHORPHAN 100-10MG/5
5 SYRUP ORAL 3 TIMES DAILY PRN
Qty: 120 ML | Refills: 0 | Status: SHIPPED | OUTPATIENT
Start: 2020-03-23 | End: 2020-04-02

## 2020-03-23 RX ORDER — METHYLPREDNISOLONE 4 MG/1
TABLET ORAL
Qty: 1 KIT | Refills: 0 | Status: SHIPPED | OUTPATIENT
Start: 2020-03-23 | End: 2020-03-29

## 2020-03-23 RX ORDER — PREDNISONE 20 MG/1
60 TABLET ORAL ONCE
Status: COMPLETED | OUTPATIENT
Start: 2020-03-23 | End: 2020-03-23

## 2020-03-23 RX ORDER — DOXYCYCLINE HYCLATE 100 MG
100 TABLET ORAL 2 TIMES DAILY
Qty: 20 TABLET | Refills: 0 | Status: SHIPPED | OUTPATIENT
Start: 2020-03-23 | End: 2020-04-02

## 2020-03-23 RX ADMIN — ALBUTEROL SULFATE 2 PUFF: 90 AEROSOL, METERED RESPIRATORY (INHALATION) at 16:21

## 2020-03-23 RX ADMIN — PREDNISONE 60 MG: 20 TABLET ORAL at 16:23

## 2020-03-23 NOTE — ED PROVIDER NOTES
[facility]     Department of Emergency Medicine   ED  Provider Note  Admit Date/RoomTime: 3/23/2020  1:57 PM  ED Room: [address] [place] [address]   Chief Complaint   URI (for 17 days, HA, runny jennifer, cough, fever, chills, SOB); Shortness of Breath; and Fever    History of Present Illness   Source of history provided by:  patient. History/Exam Limitations: none. Leotha Kayser is a 60 y.o. old male who has a past medical history of:   Past Medical History:   Diagnosis Date    Chronic back pain     [address], non-alcoholic ([facility]     COPD (chronic obstructive pulmonary disease) (HCC)     Emphysema of lung (HCC)     Headache     5 per month, 4/10 on the pain scale    History of blood disorder     Migraine     1 per month, 7/10 on the pain scale    Neck pain     Neuropathy     Numbness and tingling     feet and hands    Pain in both lower legs     Shaking     Stroke (cerebrum) ([facility] 1997    presents to the emergency department by private vehicle, with complaints of gradual onset productive cough with sputum described as green which began 3 week(s) prior to arrival.  The symptoms are associated with fever and bodyaches and denies hemoptysis abdominal pain, chest pain or leg swelling. He has prior history of COPD in the past and is a smoker. Since onset the symptoms have been stable and mild-moderate in severity. The symptoms are aggravated by exertion and relieved by nothing. He denies any recent travel or known sick contacts. ROS   Pertinent positives and negatives are stated within HPI, all other systems reviewed and are negative. Past Surgical History:   Procedure Laterality Date    [facility]   Social History:  reports that he has been smoking. He has been smoking about 0.50 packs per day. He has never used smokeless tobacco. He reports that he does not drink alcohol or use drugs.   Family History: family history includes Alzheimer's Disease in his mother; well-appearing, afebrile. Not hypoxic. Presents with cough for 3 weeks with production of green sputum. Chest x-ray obtained and negative for acute infiltrate. Influenza A or B, respiratory viruses or Unable to test for Covid-19 due to national shortage  related to Covid -19 pandemic. Therefore will start steroids, doxycycline for presumed COPD exacerbation, as well as cough medication. Patient instructed to self quarantine for 14 days from the onset of symptoms, educated on infection control practices. Follow-up with PCP for recheck. Return to ED for any new or worsening symptoms. Counseling: The emergency provider has spoken with the patient and discussed todays results, in addition to providing specific details for the plan of care and counseling regarding the diagnosis and prognosis. Questions are answered at this time and they are agreeable with the plan. Assessment      1. COPD exacerbation ([facility]      Plan   Discharge to home  Patient condition is good    New Medications     Discharge Medication List as of 3/23/2020  3:46 PM      START taking these medications    Details   doxycycline hyclate (VIBRA-TABS) 100 MG tablet Take 1 tablet by mouth 2 times daily for 10 days, Disp-20 tablet, R-0Print      methylPREDNISolone (MEDROL, CASIMIRO,) 4 MG tablet Take by mouth., Disp-1 kit, R-0Print      guaiFENesin-dextromethorphan (ROBITUSSIN DM) 100-10 MG/5ML syrup Take 5 mLs by mouth 3 times daily as needed for Cough, Disp-120 mL, R-0Print           Electronically signed by DARWIN Cadet CNP   DD: 3/23/20  **This report was transcribed using voice recognition software. Every effort was made to ensure accuracy; however, inadvertent computerized transcription errors may be present.   END OF ED PROVIDER NOTE      DARWIN Champagne CNP  03/23/20 9037

## 2020-05-16 ENCOUNTER — APPOINTMENT (OUTPATIENT)
Dept: GENERAL RADIOLOGY | Age: 60
End: 2020-05-16
Payer: MEDICARE

## 2020-05-16 ENCOUNTER — HOSPITAL ENCOUNTER (EMERGENCY)
Age: 60
Discharge: HOME | End: 2020-05-16
Attending: EMERGENCY MEDICINE
Payer: MEDICARE

## 2020-05-16 VITALS
OXYGEN SATURATION: 97 % | DIASTOLIC BLOOD PRESSURE: 88 MMHG | TEMPERATURE: 98.6 F | HEART RATE: 82 BPM | SYSTOLIC BLOOD PRESSURE: 130 MMHG | RESPIRATION RATE: 18 BRPM

## 2020-05-16 LAB
ALBUMIN SERPL-MCNC: 3.4 G/DL (ref 3.5–5.2)
ALP BLD-CCNC: 221 U/L (ref 40–129)
ALT SERPL-CCNC: 31 U/L (ref 0–40)
ANION GAP SERPL CALCULATED.3IONS-SCNC: 10 MMOL/L (ref 7–16)
ANISOCYTOSIS: ABNORMAL
AST SERPL-CCNC: 64 U/L (ref 0–39)
BASOPHILS ABSOLUTE: 0.06 E9/L (ref 0–0.2)
BASOPHILS RELATIVE PERCENT: 1 % (ref 0–2)
BILIRUB SERPL-MCNC: 1.7 MG/DL (ref 0–1.2)
BUN BLDV-MCNC: 13 MG/DL (ref 8–23)
CALCIUM SERPL-MCNC: 9.3 MG/DL (ref 8.6–10.2)
CHLORIDE BLD-SCNC: 104 MMOL/L (ref 98–107)
CO2: 26 MMOL/L (ref 22–29)
CREAT SERPL-MCNC: 1 MG/DL (ref 0.7–1.2)
EOSINOPHILS ABSOLUTE: 0.23 E9/L (ref 0.05–0.5)
EOSINOPHILS RELATIVE PERCENT: 4 % (ref 0–6)
GFR AFRICAN AMERICAN: >60
GFR NON-AFRICAN AMERICAN: >60 ML/MIN/1.73
GLUCOSE BLD-MCNC: 174 MG/DL (ref 74–99)
HCT VFR BLD CALC: 41.1 % (ref 37–54)
HEMOGLOBIN: 13.6 G/DL (ref 12.5–16.5)
IMMATURE GRANULOCYTES #: 0.02 E9/L
IMMATURE GRANULOCYTES %: 0.3 % (ref 0–5)
LYMPHOCYTES ABSOLUTE: 1.27 E9/L (ref 1.5–4)
LYMPHOCYTES RELATIVE PERCENT: 21.9 % (ref 20–42)
MCH RBC QN AUTO: 35 PG (ref 26–35)
MCHC RBC AUTO-ENTMCNC: 33.1 % (ref 32–34.5)
MCV RBC AUTO: 105.7 FL (ref 80–99.9)
MONOCYTES ABSOLUTE: 0.55 E9/L (ref 0.1–0.95)
MONOCYTES RELATIVE PERCENT: 9.5 % (ref 2–12)
NEUTROPHILS ABSOLUTE: 3.66 E9/L (ref 1.8–7.3)
NEUTROPHILS RELATIVE PERCENT: 63.3 % (ref 43–80)
PDW BLD-RTO: 15.1 FL (ref 11.5–15)
PLATELET # BLD: 95 E9/L (ref 130–450)
PLATELET CONFIRMATION: NORMAL
PMV BLD AUTO: 10.2 FL (ref 7–12)
POTASSIUM REFLEX MAGNESIUM: 3.9 MMOL/L (ref 3.5–5)
PRO-BNP: 69 PG/ML (ref 0–125)
RBC # BLD: 3.89 E12/L (ref 3.8–5.8)
SODIUM BLD-SCNC: 140 MMOL/L (ref 132–146)
TOTAL PROTEIN: 6.5 G/DL (ref 6.4–8.3)
TROPONIN: <0.01 NG/ML (ref 0–0.03)
WBC # BLD: 5.8 E9/L (ref 4.5–11.5)

## 2020-05-16 PROCEDURE — 93005 ELECTROCARDIOGRAM TRACING: CPT | Performed by: PHYSICIAN ASSISTANT

## 2020-05-16 PROCEDURE — 84484 ASSAY OF TROPONIN QUANT: CPT

## 2020-05-16 PROCEDURE — 85025 COMPLETE CBC W/AUTO DIFF WBC: CPT

## 2020-05-16 PROCEDURE — 99285 EMERGENCY DEPT VISIT HI MDM: CPT

## 2020-05-16 PROCEDURE — 71045 X-RAY EXAM CHEST 1 VIEW: CPT

## 2020-05-16 PROCEDURE — 80053 COMPREHEN METABOLIC PANEL: CPT

## 2020-05-16 PROCEDURE — 83880 ASSAY OF NATRIURETIC PEPTIDE: CPT

## 2020-05-16 PROCEDURE — U0003 INFECTIOUS AGENT DETECTION BY NUCLEIC ACID (DNA OR RNA); SEVERE ACUTE RESPIRATORY SYNDROME CORONAVIRUS 2 (SARS-COV-2) (CORONAVIRUS DISEASE [COVID-19]), AMPLIFIED PROBE TECHNIQUE, MAKING USE OF HIGH THROUGHPUT TECHNOLOGIES AS DESCRIBED BY CMS-2020-01-R: HCPCS

## 2020-05-16 NOTE — ED PROVIDER NOTES
ED Attending  CC: No    HPI:  5/16/20,   Time: 2:48 PM EDT       Brenda Tierney is a 60 y.o. male presenting to the ED for cough, beginning 2 months ago. The complaint has been intermittent, mild in severity, and worsened by nothing. Patient reports that he was evaluated in the emergency department at the end of March for cough and congestion. He states at that time COVID testing was not available but he did self quarantine for 2 weeks. He has been afebrile at home without sick contacts or recent travel. Reports intermittent chest discomfort and shortness of breath as well for last 2 months. Denies any recent change in activity, medication, or diet. Patient denies all other symptoms at this time. Review of Systems:   Pertinent positives and negatives are stated within HPI, all other systems reviewed and are negative.          --------------------------------------------- PAST HISTORY ---------------------------------------------  Past Medical History:  has a past medical history of Chronic back pain, Cirrhosis, non-alcoholic ([facility] COPD (chronic obstructive pulmonary disease) ([facility] [address] [facility] [facility] Headache, History of blood disorder, Migraine, Neck pain, Neuropathy, Numbness and tingling, Pain in both lower legs, Shaking, and Stroke [facility]rebrum) [facility] Past Surgical History:  has a past surgical history that includes Cholecystectomy (04/2015). Social History:  reports that he has been smoking. He has been smoking about 0.50 packs per day. He has never used smokeless tobacco. He reports that he does not drink alcohol or use drugs. Family History: family history includes Alzheimer's Disease in his mother; Arthritis in his father; Breast Cancer in his mother; Diabetes in his father; Heart Disease in his father. The patients home medications have been reviewed.     Allergies: Chocolate; Grass extracts [gramineae pollens]; and Carafate [sucralfate]        ---------------------------------------------------PHYSICAL EXAM--------------------------------------    Constitutional/General: Alert and oriented x3, well appearing, non toxic in NAD  Head: Normocephalic and atraumatic  Eyes: PERRL, EOMI, conjunctive normal, sclera non icteric  Mouth: Oropharynx clear, handling secretions, no trismus, no asymmetry of the posterior oropharynx or uvular edema  Neck: Supple, full ROM, non tender to palpation in the midline, no stridor, no crepitus, no meningeal signs  Respiratory: Lungs clear to auscultation bilaterally, no wheezes, rales, or rhonchi. Not in respiratory distress  Cardiovascular:  Regular rate. Regular rhythm. No murmurs, gallops, or rubs. 2+ distal pulses  Chest: No chest wall tenderness  GI:  Abdomen Soft, Non tender, Non distended. +BS. No organomegaly, no palpable masses,  No rebound, guarding, or rigidity. Musculoskeletal: Moves all extremities x 4. Warm and well perfused, no clubbing, cyanosis, or edema. Capillary refill <3 seconds  Integument: skin warm and dry. No rashes. Lymphatic: no lymphadenopathy noted  Neurologic: GCS 15, no focal deficits, symmetric strength 5/5 in the upper and lower extremities bilaterally  Psychiatric: Normal Affect    -------------------------------------------------- RESULTS -------------------------------------------------  I have personally reviewed all laboratory and imaging results for this patient. Results are listed below.      LABS:  Results for orders placed or performed during the hospital encounter of 05/16/20   CBC Auto Differential   Result Value Ref Range    WBC 5.8 4.5 - 11.5 E9/L    RBC 3.89 3.80 - 5.80 E12/L    Hemoglobin 13.6 12.5 - 16.5 g/dL    Hematocrit 41.1 37.0 - 54.0 %    .7 (H) 80.0 - 99.9 fL    MCH 35.0 26.0 - 35.0 pg    MCHC 33.1 32.0 - 34.5 %    RDW 15.1 (H) 11.5 - 15.0 fL    Platelets 95 (L) 323 - 450 E9/L    MPV 10.2 7.0 - 12.0 fL    Neutrophils % 63.3 43.0 - 80.0 % Interpretation: Normal    The patients available past medical records and past encounters were reviewed. ------------------------------ ED COURSE/MEDICAL DECISION MAKING----------------------  Medications - No data to display      ED COURSE:       Medical Decision Making:    Patient is a 57-year-old male presenting emergency department for concerns for COVID-19 due to cough for the last 6 weeks. Vitals have remained stable in the emergency department, he has been asymptomatic in the ED, and lab work is reassuring. Recommended close follow-up with PCP for recheck return the emergency department any new or worsening symptoms. Patient was given outpatient COVID testing in the ED today. Patient voiced understanding is agreeable above treatment plan. This patient's ED course included: a personal history and physicial examination    This patient has remained hemodynamically stable during their ED course. Re-Evaluations:             Re-evaluation. Patients symptoms are improving    Re-examination  5/16/20   2:48 PM EDT          Vital Signs:   Vitals:    05/16/20 1409 05/16/20 1422 05/16/20 1737   BP:  130/76 130/88   Pulse: 101 87 82   Resp:  16 18   Temp:  98.6 °F (37 °C)    SpO2: 95% 96% 97%     Card/Pulm:  Rhythm: normal rate. Heart Sounds: Normal S1, S2 and no murmurs, gallops, or rubs. clear to auscultation, no wheezes or rales and unlabored breathing. Capillary Refill: normal.  Radial Pulse:  present 2+. Skin:  Dry, Pink and Warm. Counseling: The emergency provider has spoken with the patient and discussed todays results, in addition to providing specific details for the plan of care and counseling regarding the diagnosis and prognosis. Questions are answered at this time and they are agreeable with the plan.       --------------------------------- IMPRESSION AND DISPOSITION ---------------------------------    IMPRESSION  1.  Acute upper respiratory infection DISPOSITION  Disposition: Discharge to home  Patient condition is stable    NOTE: This report was transcribed using voice recognition software.  Every effort was made to ensure accuracy; however, inadvertent computerized transcription errors may be present        Jack Jenkins  05/16/20 [id number]

## 2020-05-17 LAB
EKG ATRIAL RATE: 79 BPM
EKG P AXIS: 83 DEGREES
EKG P-R INTERVAL: 134 MS
EKG Q-T INTERVAL: 434 MS
EKG QRS DURATION: 98 MS
EKG QTC CALCULATION (BAZETT): 497 MS
EKG R AXIS: 67 DEGREES
EKG T AXIS: 10 DEGREES
EKG VENTRICULAR RATE: 79 BPM

## 2020-05-17 PROCEDURE — 93010 ELECTROCARDIOGRAM REPORT: CPT | Performed by: INTERNAL MEDICINE

## 2020-05-18 ENCOUNTER — CARE COORDINATION (OUTPATIENT)
Dept: CARE COORDINATION | Age: 60
End: 2020-05-18

## 2020-05-18 LAB
SARS-COV-2: NOT DETECTED
SOURCE: NORMAL

## 2020-05-18 NOTE — CARE COORDINATION
tabletops, doorknobs, bathroom fixtures, toilets, phones, keyboards, tablets, and bedside tables. Also, clean any surfaces that may have blood, stool, or body fluids on them. Use a household cleaning spray or wipe, according to the label instructions. Labels contain instructions for safe and effective use of the cleaning product including precautions you should take when applying the product, such as wearing gloves and making sure you have good ventilation during use of the product. Monitor your symptoms  Seek prompt medical attention if your illness is worsening (e.g., difficulty breathing). Before seeking care, call your healthcare provider and tell them that you have, or are being evaluated for, COVID-19. Put on a facemask before you enter the facility. These steps will help the healthcare provider's office to keep other people in the office or waiting room from getting infected or exposed. Ask your healthcare provider to call the local or [facility] health department. Persons who are placed under If you have a medical emergency and need to call 911, notify the dispatch personnel that you have, or are being evaluated for COVID-19. If possible, put on a facemask before emergency medical services arrive. The patient agrees to contact the [facility] exposure line [phone], local health department [place] Department of Health: ([phone]) and PCP office for questions related to their healthcare. Author provided contact information for future reference.     Patient/family/caregiver given information for [employer] and agrees to enroll no

## 2020-06-03 ENCOUNTER — CARE COORDINATION (OUTPATIENT)
Dept: CARE COORDINATION | Age: 60
End: 2020-06-03

## 2020-06-09 ENCOUNTER — HOSPITAL ENCOUNTER (OUTPATIENT)
Age: 60
Discharge: HOME | End: 2020-06-11
Payer: MEDICARE

## 2020-06-09 LAB
ANISOCYTOSIS: ABNORMAL
BASOPHILS ABSOLUTE: 0.06 E9/L (ref 0–0.2)
BASOPHILS RELATIVE PERCENT: 0.9 % (ref 0–2)
BURR CELLS: ABNORMAL
EOSINOPHILS ABSOLUTE: 0.21 E9/L (ref 0.05–0.5)
EOSINOPHILS RELATIVE PERCENT: 3.2 % (ref 0–6)
HCT VFR BLD CALC: 41.2 % (ref 37–54)
HEMOGLOBIN: 13.3 G/DL (ref 12.5–16.5)
IMMATURE GRANULOCYTES #: 0.02 E9/L
IMMATURE GRANULOCYTES %: 0.3 % (ref 0–5)
LYMPHOCYTES ABSOLUTE: 1.31 E9/L (ref 1.5–4)
LYMPHOCYTES RELATIVE PERCENT: 19.8 % (ref 20–42)
MCH RBC QN AUTO: 35.6 PG (ref 26–35)
MCHC RBC AUTO-ENTMCNC: 32.3 % (ref 32–34.5)
MCV RBC AUTO: 110.2 FL (ref 80–99.9)
MONOCYTES ABSOLUTE: 0.5 E9/L (ref 0.1–0.95)
MONOCYTES RELATIVE PERCENT: 7.5 % (ref 2–12)
NEUTROPHILS ABSOLUTE: 4.53 E9/L (ref 1.8–7.3)
NEUTROPHILS RELATIVE PERCENT: 68.3 % (ref 43–80)
PDW BLD-RTO: 15.2 FL (ref 11.5–15)
PLATELET # BLD: 96 E9/L (ref 130–450)
PLATELET CONFIRMATION: NORMAL
PMV BLD AUTO: 10.7 FL (ref 7–12)
POIKILOCYTES: ABNORMAL
POLYCHROMASIA: ABNORMAL
RBC # BLD: 3.74 E12/L (ref 3.8–5.8)
WBC # BLD: 6.6 E9/L (ref 4.5–11.5)

## 2020-06-09 PROCEDURE — 36415 COLL VENOUS BLD VENIPUNCTURE: CPT

## 2020-06-09 PROCEDURE — 80053 COMPREHEN METABOLIC PANEL: CPT

## 2020-06-09 PROCEDURE — 80061 LIPID PANEL: CPT

## 2020-06-09 PROCEDURE — 83036 HEMOGLOBIN GLYCOSYLATED A1C: CPT

## 2020-06-09 PROCEDURE — 85025 COMPLETE CBC W/AUTO DIFF WBC: CPT

## 2020-06-10 LAB
ALBUMIN SERPL-MCNC: 3.4 G/DL (ref 3.5–5.2)
ALP BLD-CCNC: 200 U/L (ref 40–129)
ALT SERPL-CCNC: 36 U/L (ref 0–40)
ANION GAP SERPL CALCULATED.3IONS-SCNC: 15 MMOL/L (ref 7–16)
AST SERPL-CCNC: 73 U/L (ref 0–39)
BILIRUB SERPL-MCNC: 1.8 MG/DL (ref 0–1.2)
BUN BLDV-MCNC: 13 MG/DL (ref 8–23)
CALCIUM SERPL-MCNC: 9.4 MG/DL (ref 8.6–10.2)
CHLORIDE BLD-SCNC: 104 MMOL/L (ref 98–107)
CHOLESTEROL, TOTAL: 185 MG/DL (ref 0–199)
CO2: 23 MMOL/L (ref 22–29)
CREAT SERPL-MCNC: 1 MG/DL (ref 0.7–1.2)
GFR AFRICAN AMERICAN: >60
GFR NON-AFRICAN AMERICAN: >60 ML/MIN/1.73
GLUCOSE BLD-MCNC: 133 MG/DL (ref 74–99)
HBA1C MFR BLD: 6.1 % (ref 4–5.6)
HDLC SERPL-MCNC: 52 MG/DL
LDL CHOLESTEROL CALCULATED: 110 MG/DL (ref 0–99)
POTASSIUM SERPL-SCNC: 4.5 MMOL/L (ref 3.5–5)
SODIUM BLD-SCNC: 142 MMOL/L (ref 132–146)
TOTAL PROTEIN: 6.8 G/DL (ref 6.4–8.3)
TRIGL SERPL-MCNC: 114 MG/DL (ref 0–149)
VLDLC SERPL CALC-MCNC: 23 MG/DL

## 2020-06-11 LAB — MICROALBUMIN UR-MCNC: <12 MG/L

## 2020-06-11 PROCEDURE — 82044 UR ALBUMIN SEMIQUANTITATIVE: CPT

## 2020-06-30 ENCOUNTER — APPOINTMENT (OUTPATIENT)
Dept: GENERAL RADIOLOGY | Age: 60
End: 2020-06-30
Payer: MEDICARE

## 2020-06-30 ENCOUNTER — HOSPITAL ENCOUNTER (EMERGENCY)
Age: 60
Discharge: HOME | End: 2020-06-30
Attending: EMERGENCY MEDICINE
Payer: MEDICARE

## 2020-06-30 ENCOUNTER — APPOINTMENT (OUTPATIENT)
Dept: ULTRASOUND IMAGING | Age: 60
End: 2020-06-30
Payer: MEDICARE

## 2020-06-30 VITALS
TEMPERATURE: 98.7 F | WEIGHT: 266 LBS | OXYGEN SATURATION: 98 % | BODY MASS INDEX: 36.03 KG/M2 | HEART RATE: 87 BPM | RESPIRATION RATE: 18 BRPM | HEIGHT: 72 IN | DIASTOLIC BLOOD PRESSURE: 83 MMHG | SYSTOLIC BLOOD PRESSURE: 131 MMHG

## 2020-06-30 LAB
ALBUMIN SERPL-MCNC: 3.4 G/DL (ref 3.5–5.2)
ALP BLD-CCNC: 173 U/L (ref 40–129)
ALT SERPL-CCNC: 36 U/L (ref 0–40)
ANION GAP SERPL CALCULATED.3IONS-SCNC: 8 MMOL/L (ref 7–16)
AST SERPL-CCNC: 72 U/L (ref 0–39)
BASOPHILS ABSOLUTE: 0.05 E9/L (ref 0–0.2)
BASOPHILS RELATIVE PERCENT: 1 % (ref 0–2)
BILIRUB SERPL-MCNC: 1.6 MG/DL (ref 0–1.2)
BUN BLDV-MCNC: 14 MG/DL (ref 8–23)
CALCIUM SERPL-MCNC: 9.4 MG/DL (ref 8.6–10.2)
CHLORIDE BLD-SCNC: 106 MMOL/L (ref 98–107)
CO2: 28 MMOL/L (ref 22–29)
CREAT SERPL-MCNC: 1 MG/DL (ref 0.7–1.2)
EOSINOPHILS ABSOLUTE: 0.11 E9/L (ref 0.05–0.5)
EOSINOPHILS RELATIVE PERCENT: 2.1 % (ref 0–6)
GFR AFRICAN AMERICAN: >60
GFR NON-AFRICAN AMERICAN: >60 ML/MIN/1.73
GLUCOSE BLD-MCNC: 172 MG/DL (ref 74–99)
HCT VFR BLD CALC: 40 % (ref 37–54)
HEMOGLOBIN: 13.3 G/DL (ref 12.5–16.5)
IMMATURE GRANULOCYTES #: 0.01 E9/L
IMMATURE GRANULOCYTES %: 0.2 % (ref 0–5)
LYMPHOCYTES ABSOLUTE: 0.97 E9/L (ref 1.5–4)
LYMPHOCYTES RELATIVE PERCENT: 18.8 % (ref 20–42)
MCH RBC QN AUTO: 35.7 PG (ref 26–35)
MCHC RBC AUTO-ENTMCNC: 33.3 % (ref 32–34.5)
MCV RBC AUTO: 107.2 FL (ref 80–99.9)
MONOCYTES ABSOLUTE: 0.47 E9/L (ref 0.1–0.95)
MONOCYTES RELATIVE PERCENT: 9.1 % (ref 2–12)
NEUTROPHILS ABSOLUTE: 3.56 E9/L (ref 1.8–7.3)
NEUTROPHILS RELATIVE PERCENT: 68.8 % (ref 43–80)
PDW BLD-RTO: 14.5 FL (ref 11.5–15)
PLATELET # BLD: 94 E9/L (ref 130–450)
PLATELET CONFIRMATION: NORMAL
PMV BLD AUTO: 10.1 FL (ref 7–12)
POTASSIUM SERPL-SCNC: 4 MMOL/L (ref 3.5–5)
PRO-BNP: 66 PG/ML (ref 0–125)
RBC # BLD: 3.73 E12/L (ref 3.8–5.8)
SODIUM BLD-SCNC: 142 MMOL/L (ref 132–146)
TOTAL PROTEIN: 6.5 G/DL (ref 6.4–8.3)
TROPONIN: <0.01 NG/ML (ref 0–0.03)
WBC # BLD: 5.2 E9/L (ref 4.5–11.5)

## 2020-06-30 PROCEDURE — 71045 X-RAY EXAM CHEST 1 VIEW: CPT

## 2020-06-30 PROCEDURE — 83880 ASSAY OF NATRIURETIC PEPTIDE: CPT

## 2020-06-30 PROCEDURE — 99284 EMERGENCY DEPT VISIT MOD MDM: CPT

## 2020-06-30 PROCEDURE — 84484 ASSAY OF TROPONIN QUANT: CPT

## 2020-06-30 PROCEDURE — 80053 COMPREHEN METABOLIC PANEL: CPT

## 2020-06-30 PROCEDURE — 93005 ELECTROCARDIOGRAM TRACING: CPT | Performed by: EMERGENCY MEDICINE

## 2020-06-30 PROCEDURE — 93970 EXTREMITY STUDY: CPT

## 2020-06-30 PROCEDURE — 96374 THER/PROPH/DIAG INJ IV PUSH: CPT

## 2020-06-30 PROCEDURE — 6360000002 HC RX W HCPCS: Performed by: EMERGENCY MEDICINE

## 2020-06-30 PROCEDURE — 85025 COMPLETE CBC W/AUTO DIFF WBC: CPT

## 2020-06-30 RX ORDER — FUROSEMIDE 10 MG/ML
20 INJECTION INTRAMUSCULAR; INTRAVENOUS ONCE
Status: COMPLETED | OUTPATIENT
Start: 2020-06-30 | End: 2020-06-30

## 2020-06-30 RX ADMIN — FUROSEMIDE 20 MG: 20 INJECTION, SOLUTION INTRAMUSCULAR; INTRAVENOUS at 22:22

## 2020-06-30 NOTE — ED NOTES
12 lead ekg given to dr. Leena Butler and no new orders received at this time.       Zac Dan RN  06/30/20 4086

## 2020-07-01 LAB
EKG ATRIAL RATE: 87 BPM
EKG P AXIS: 84 DEGREES
EKG P-R INTERVAL: 136 MS
EKG Q-T INTERVAL: 418 MS
EKG QRS DURATION: 100 MS
EKG QTC CALCULATION (BAZETT): 502 MS
EKG R AXIS: 51 DEGREES
EKG T AXIS: 43 DEGREES
EKG VENTRICULAR RATE: 87 BPM

## 2020-07-01 PROCEDURE — 93010 ELECTROCARDIOGRAM REPORT: CPT | Performed by: INTERNAL MEDICINE

## 2020-07-14 ENCOUNTER — HOSPITAL ENCOUNTER (OUTPATIENT)
Age: 60
Discharge: HOME | End: 2020-07-16
Payer: MEDICARE

## 2020-07-14 LAB
ALBUMIN SERPL-MCNC: 3.4 G/DL (ref 3.5–5.2)
ALP BLD-CCNC: 215 U/L (ref 40–129)
ALT SERPL-CCNC: 38 U/L (ref 0–40)
ANION GAP SERPL CALCULATED.3IONS-SCNC: 13 MMOL/L (ref 7–16)
APTT: 34.1 SEC (ref 24.5–35.1)
AST SERPL-CCNC: 77 U/L (ref 0–39)
BASOPHILS ABSOLUTE: 0.06 E9/L (ref 0–0.2)
BASOPHILS RELATIVE PERCENT: 0.9 % (ref 0–2)
BILIRUB SERPL-MCNC: 1.9 MG/DL (ref 0–1.2)
BUN BLDV-MCNC: 14 MG/DL (ref 8–23)
CALCIUM SERPL-MCNC: 9.7 MG/DL (ref 8.6–10.2)
CHLORIDE BLD-SCNC: 101 MMOL/L (ref 98–107)
CO2: 28 MMOL/L (ref 22–29)
CREAT SERPL-MCNC: 1.1 MG/DL (ref 0.7–1.2)
EOSINOPHILS ABSOLUTE: 0.32 E9/L (ref 0.05–0.5)
EOSINOPHILS RELATIVE PERCENT: 4.8 % (ref 0–6)
FOLATE: 17 NG/ML (ref 4.8–24.2)
GFR AFRICAN AMERICAN: >60
GFR NON-AFRICAN AMERICAN: >60 ML/MIN/1.73
GLUCOSE BLD-MCNC: 170 MG/DL (ref 74–99)
HCT VFR BLD CALC: 40.7 % (ref 37–54)
HEMOGLOBIN: 13.3 G/DL (ref 12.5–16.5)
IMMATURE GRANULOCYTES #: 0.02 E9/L
IMMATURE GRANULOCYTES %: 0.3 % (ref 0–5)
IMMATURE RETIC FRACT: 9.9 % (ref 2.3–13.4)
INR BLD: 1.2
LYMPHOCYTES ABSOLUTE: 1.09 E9/L (ref 1.5–4)
LYMPHOCYTES RELATIVE PERCENT: 16.3 % (ref 20–42)
MAGNESIUM: 1.5 MG/DL (ref 1.6–2.6)
MCH RBC QN AUTO: 35.7 PG (ref 26–35)
MCHC RBC AUTO-ENTMCNC: 32.7 % (ref 32–34.5)
MCV RBC AUTO: 109.1 FL (ref 80–99.9)
MONOCYTES ABSOLUTE: 0.67 E9/L (ref 0.1–0.95)
MONOCYTES RELATIVE PERCENT: 10 % (ref 2–12)
NEUTROPHILS ABSOLUTE: 4.51 E9/L (ref 1.8–7.3)
NEUTROPHILS RELATIVE PERCENT: 67.7 % (ref 43–80)
PDW BLD-RTO: 14.7 FL (ref 11.5–15)
PLATELET # BLD: 87 E9/L (ref 130–450)
PLATELET CONFIRMATION: NORMAL
PMV BLD AUTO: 10.7 FL (ref 7–12)
POTASSIUM SERPL-SCNC: 4.1 MMOL/L (ref 3.5–5)
PROTHROMBIN TIME: 13.1 SEC (ref 9.3–12.4)
RBC # BLD: 3.73 E12/L (ref 3.8–5.8)
RETIC HGB EQUIVALENT: 40.6 PG (ref 28.2–36.6)
RETICULOCYTE ABSOLUTE COUNT: 0.08 E12/L
RETICULOCYTE COUNT PCT: 2.1 % (ref 0.4–1.9)
SODIUM BLD-SCNC: 142 MMOL/L (ref 132–146)
TOTAL PROTEIN: 6.6 G/DL (ref 6.4–8.3)
TSH SERPL DL<=0.05 MIU/L-ACNC: 3.03 UIU/ML (ref 0.27–4.2)
VITAMIN B-12: 1228 PG/ML (ref 211–946)
WBC # BLD: 6.7 E9/L (ref 4.5–11.5)

## 2020-07-14 PROCEDURE — 82607 VITAMIN B-12: CPT

## 2020-07-14 PROCEDURE — 36415 COLL VENOUS BLD VENIPUNCTURE: CPT

## 2020-07-14 PROCEDURE — 82746 ASSAY OF FOLIC ACID SERUM: CPT

## 2020-07-14 PROCEDURE — 84443 ASSAY THYROID STIM HORMONE: CPT

## 2020-07-14 PROCEDURE — 85045 AUTOMATED RETICULOCYTE COUNT: CPT

## 2020-07-14 PROCEDURE — 80053 COMPREHEN METABOLIC PANEL: CPT

## 2020-07-14 PROCEDURE — 82105 ALPHA-FETOPROTEIN SERUM: CPT

## 2020-07-14 PROCEDURE — 85025 COMPLETE CBC W/AUTO DIFF WBC: CPT

## 2020-07-14 PROCEDURE — 83735 ASSAY OF MAGNESIUM: CPT

## 2020-07-14 PROCEDURE — 85730 THROMBOPLASTIN TIME PARTIAL: CPT

## 2020-07-14 PROCEDURE — 85610 PROTHROMBIN TIME: CPT

## 2020-07-16 LAB — AFP-TUMOR MARKER: 13 NG/ML (ref 0–9)

## 2020-08-11 ENCOUNTER — HOSPITAL ENCOUNTER (OUTPATIENT)
Age: 60
Discharge: HOME | End: 2020-08-13
Payer: MEDICARE

## 2020-08-11 LAB — MAGNESIUM: 1.5 MG/DL (ref 1.6–2.6)

## 2020-08-11 PROCEDURE — 83735 ASSAY OF MAGNESIUM: CPT

## 2020-08-11 PROCEDURE — 36415 COLL VENOUS BLD VENIPUNCTURE: CPT

## 2020-08-27 ENCOUNTER — HOSPITAL ENCOUNTER (OUTPATIENT)
Age: 60
Discharge: HOME | End: 2020-08-29
Payer: MEDICARE

## 2020-08-27 LAB — MAGNESIUM: 1.8 MG/DL (ref 1.6–2.6)

## 2020-08-27 PROCEDURE — 36415 COLL VENOUS BLD VENIPUNCTURE: CPT

## 2020-08-27 PROCEDURE — 83735 ASSAY OF MAGNESIUM: CPT

## 2021-01-01 ENCOUNTER — APPOINTMENT (OUTPATIENT)
Dept: GENERAL RADIOLOGY | Age: 61
DRG: 641 | End: 2021-01-01
Payer: MEDICARE

## 2021-01-01 ENCOUNTER — APPOINTMENT (OUTPATIENT)
Dept: CT IMAGING | Age: 61
DRG: 641 | End: 2021-01-01
Payer: MEDICARE

## 2021-01-01 ENCOUNTER — HOSPITAL ENCOUNTER (OUTPATIENT)
Dept: MRI IMAGING | Age: 61
Discharge: HOME | End: 2021-03-20
Payer: MEDICARE

## 2021-01-01 ENCOUNTER — HOSPITAL ENCOUNTER (INPATIENT)
Age: 61
LOS: 2 days | Discharge: HOSPICE | DRG: 641 | End: 2021-09-07
Attending: EMERGENCY MEDICINE | Admitting: STUDENT IN AN ORGANIZED HEALTH CARE EDUCATION/TRAINING PROGRAM
Payer: MEDICARE

## 2021-01-01 VITALS
RESPIRATION RATE: 18 BRPM | OXYGEN SATURATION: 92 % | BODY MASS INDEX: 32.03 KG/M2 | HEART RATE: 95 BPM | WEIGHT: 236.5 LBS | DIASTOLIC BLOOD PRESSURE: 60 MMHG | HEIGHT: 72 IN | TEMPERATURE: 98 F | SYSTOLIC BLOOD PRESSURE: 137 MMHG

## 2021-01-01 DIAGNOSIS — K74.60 HEPATIC CIRRHOSIS, UNSPECIFIED HEPATIC CIRRHOSIS TYPE, UNSPECIFIED WHETHER ASCITES PRESENT (HCC): ICD-10-CM

## 2021-01-01 DIAGNOSIS — C22.8 LIVER CANCER, PRIMARY, WITH METASTASIS FROM LIVER TO OTHER SITE (HCC): ICD-10-CM

## 2021-01-01 DIAGNOSIS — K74.60 CIRRHOSIS OF LIVER WITH ASCITES, UNSPECIFIED HEPATIC CIRRHOSIS TYPE (HCC): Primary | ICD-10-CM

## 2021-01-01 DIAGNOSIS — R18.8 CIRRHOSIS OF LIVER WITH ASCITES, UNSPECIFIED HEPATIC CIRRHOSIS TYPE (HCC): Primary | ICD-10-CM

## 2021-01-01 DIAGNOSIS — K76.9 DISEASE OF LIVER: ICD-10-CM

## 2021-01-01 DIAGNOSIS — R62.7 FAILURE TO THRIVE IN ADULT: ICD-10-CM

## 2021-01-01 LAB
AFP-TUMOR MARKER: 159 NG/ML (ref 0–9)
ALBUMIN SERPL-MCNC: 2.7 G/DL (ref 3.5–5.2)
ALBUMIN SERPL-MCNC: 3.4 G/DL (ref 3.5–5.2)
ALP BLD-CCNC: 175 U/L (ref 40–129)
ALP BLD-CCNC: 248 U/L (ref 40–129)
ALT SERPL-CCNC: 27 U/L (ref 0–40)
ALT SERPL-CCNC: 27 U/L (ref 0–40)
AMMONIA: 39 UMOL/L (ref 16–60)
AMMONIA: 42 UMOL/L (ref 16–60)
ANION GAP SERPL CALCULATED.3IONS-SCNC: 9 MMOL/L (ref 7–16)
ANION GAP SERPL CALCULATED.3IONS-SCNC: 9 MMOL/L (ref 7–16)
ANISOCYTOSIS: ABNORMAL
APTT: 29.8 SEC (ref 24.5–35.1)
APTT: 34.7 SEC (ref 24.5–35.1)
AST SERPL-CCNC: 68 U/L (ref 0–39)
AST SERPL-CCNC: 70 U/L (ref 0–39)
BASOPHILS ABSOLUTE: 0 E9/L (ref 0–0.2)
BASOPHILS ABSOLUTE: 0.05 E9/L (ref 0–0.2)
BASOPHILS RELATIVE PERCENT: 0.8 % (ref 0–2)
BASOPHILS RELATIVE PERCENT: 0.8 % (ref 0–2)
BILIRUB SERPL-MCNC: 2 MG/DL (ref 0–1.2)
BILIRUB SERPL-MCNC: 3.3 MG/DL (ref 0–1.2)
BILIRUBIN URINE: ABNORMAL
BLASTS RELATIVE PERCENT: 0.9 % (ref 0–0)
BLOOD CULTURE, ROUTINE: NORMAL
BLOOD, URINE: NEGATIVE
BUN BLDV-MCNC: 17 MG/DL (ref 8–23)
BUN BLDV-MCNC: 27 MG/DL (ref 6–23)
CALCIUM SERPL-MCNC: 9 MG/DL (ref 8.6–10.2)
CALCIUM SERPL-MCNC: 9.3 MG/DL (ref 8.6–10.2)
CHLORIDE BLD-SCNC: 104 MMOL/L (ref 98–107)
CHLORIDE BLD-SCNC: 106 MMOL/L (ref 98–107)
CLARITY: CLEAR
CO2: 26 MMOL/L (ref 22–29)
CO2: 27 MMOL/L (ref 22–29)
COLOR: YELLOW
CREAT SERPL-MCNC: 1.1 MG/DL (ref 0.7–1.2)
CREAT SERPL-MCNC: 1.2 MG/DL (ref 0.7–1.2)
CULTURE, BLOOD 2: NORMAL
EKG ATRIAL RATE: 96 BPM
EKG P AXIS: 73 DEGREES
EKG P-R INTERVAL: 144 MS
EKG Q-T INTERVAL: 440 MS
EKG QRS DURATION: 100 MS
EKG QTC CALCULATION (BAZETT): 552 MS
EKG R AXIS: 53 DEGREES
EKG T AXIS: 70 DEGREES
EKG VENTRICULAR RATE: 95 BPM
EOSINOPHILS ABSOLUTE: 0.27 E9/L (ref 0.05–0.5)
EOSINOPHILS ABSOLUTE: 0.37 E9/L (ref 0.05–0.5)
EOSINOPHILS RELATIVE PERCENT: 4.2 % (ref 0–6)
EOSINOPHILS RELATIVE PERCENT: 4.3 % (ref 0–6)
FERRITIN: 229 NG/ML
GFR AFRICAN AMERICAN: >60
GFR AFRICAN AMERICAN: >60
GFR NON-AFRICAN AMERICAN: >60 ML/MIN/1.73
GFR NON-AFRICAN AMERICAN: >60 ML/MIN/1.73
GLUCOSE BLD-MCNC: 117 MG/DL (ref 74–99)
GLUCOSE BLD-MCNC: 145 MG/DL (ref 74–99)
GLUCOSE URINE: NEGATIVE MG/DL
HBA1C MFR BLD: 5.7 % (ref 4–5.6)
HCT VFR BLD CALC: 28.8 % (ref 37–54)
HCT VFR BLD CALC: 37 % (ref 37–54)
HEMOGLOBIN: 12.5 G/DL (ref 12.5–16.5)
HEMOGLOBIN: 9.7 G/DL (ref 12.5–16.5)
IMMATURE GRANULOCYTES #: 0.02 E9/L
IMMATURE GRANULOCYTES %: 0.3 % (ref 0–5)
INR BLD: 1.2
INR BLD: 1.5
IRON SATURATION: 50 % (ref 20–55)
IRON: 128 MCG/DL (ref 59–158)
KETONES, URINE: ABNORMAL MG/DL
LACTIC ACID, SEPSIS: 1.7 MMOL/L (ref 0.5–1.9)
LACTIC ACID, SEPSIS: 2.4 MMOL/L (ref 0.5–1.9)
LEUKOCYTE ESTERASE, URINE: NEGATIVE
LYMPHOCYTES ABSOLUTE: 1.19 E9/L (ref 1.5–4)
LYMPHOCYTES ABSOLUTE: 1.2 E9/L (ref 1.5–4)
LYMPHOCYTES RELATIVE PERCENT: 13.9 % (ref 20–42)
LYMPHOCYTES RELATIVE PERCENT: 18.6 % (ref 20–42)
MCH RBC QN AUTO: 35.9 PG (ref 26–35)
MCH RBC QN AUTO: 36.8 PG (ref 26–35)
MCHC RBC AUTO-ENTMCNC: 33.7 % (ref 32–34.5)
MCHC RBC AUTO-ENTMCNC: 33.8 % (ref 32–34.5)
MCV RBC AUTO: 106.7 FL (ref 80–99.9)
MCV RBC AUTO: 108.8 FL (ref 80–99.9)
MONOCYTES ABSOLUTE: 0.34 E9/L (ref 0.1–0.95)
MONOCYTES ABSOLUTE: 0.68 E9/L (ref 0.1–0.95)
MONOCYTES RELATIVE PERCENT: 10.6 % (ref 2–12)
MONOCYTES RELATIVE PERCENT: 3.5 % (ref 2–12)
NEUTROPHILS ABSOLUTE: 4.2 E9/L (ref 1.8–7.3)
NEUTROPHILS ABSOLUTE: 6.62 E9/L (ref 1.8–7.3)
NEUTROPHILS RELATIVE PERCENT: 65.5 % (ref 43–80)
NEUTROPHILS RELATIVE PERCENT: 77.4 % (ref 43–80)
NITRITE, URINE: NEGATIVE
OCCULT BLOOD SCREENING: NORMAL
ORGANISM: ABNORMAL
PDW BLD-RTO: 14.4 FL (ref 11.5–15)
PDW BLD-RTO: 16.1 FL (ref 11.5–15)
PH UA: 5 (ref 5–9)
PLATELET # BLD: 87 E9/L (ref 130–450)
PLATELET # BLD: 99 E9/L (ref 130–450)
PLATELET CONFIRMATION: NORMAL
PLATELET CONFIRMATION: NORMAL
PMV BLD AUTO: 11 FL (ref 7–12)
PMV BLD AUTO: 9.9 FL (ref 7–12)
POTASSIUM REFLEX MAGNESIUM: 3.7 MMOL/L (ref 3.5–5)
POTASSIUM SERPL-SCNC: 4.9 MMOL/L (ref 3.5–5)
PRO-BNP: 630 PG/ML (ref 0–125)
PROSTATE SPECIFIC ANTIGEN: 0.06 NG/ML (ref 0–4)
PROTEIN UA: NEGATIVE MG/DL
PROTHROMBIN TIME: 13.3 SEC (ref 9.3–12.4)
PROTHROMBIN TIME: 16.3 SEC (ref 9.3–12.4)
RBC # BLD: 2.7 E12/L (ref 3.8–5.8)
RBC # BLD: 3.4 E12/L (ref 3.8–5.8)
SEDIMENTATION RATE, ERYTHROCYTE: 28 MM/HR (ref 0–15)
SODIUM BLD-SCNC: 139 MMOL/L (ref 132–146)
SODIUM BLD-SCNC: 142 MMOL/L (ref 132–146)
SPECIFIC GRAVITY UA: >=1.03 (ref 1–1.03)
TOTAL IRON BINDING CAPACITY: 256 MCG/DL (ref 250–450)
TOTAL PROTEIN: 5.5 G/DL (ref 6.4–8.3)
TOTAL PROTEIN: 6.5 G/DL (ref 6.4–8.3)
URINE CULTURE, ROUTINE: ABNORMAL
UROBILINOGEN, URINE: 1 E.U./DL
WBC # BLD: 6.4 E9/L (ref 4.5–11.5)
WBC # BLD: 8.6 E9/L (ref 4.5–11.5)

## 2021-01-01 PROCEDURE — 2580000003 HC RX 258: Performed by: STUDENT IN AN ORGANIZED HEALTH CARE EDUCATION/TRAINING PROGRAM

## 2021-01-01 PROCEDURE — 87077 CULTURE AEROBIC IDENTIFY: CPT

## 2021-01-01 PROCEDURE — 81003 URINALYSIS AUTO W/O SCOPE: CPT

## 2021-01-01 PROCEDURE — 83605 ASSAY OF LACTIC ACID: CPT

## 2021-01-01 PROCEDURE — 74177 CT ABD & PELVIS W/CONTRAST: CPT

## 2021-01-01 PROCEDURE — 80053 COMPREHEN METABOLIC PANEL: CPT

## 2021-01-01 PROCEDURE — 1200000000 HC SEMI PRIVATE

## 2021-01-01 PROCEDURE — 87186 SC STD MICRODIL/AGAR DIL: CPT

## 2021-01-01 PROCEDURE — 6370000000 HC RX 637 (ALT 250 FOR IP): Performed by: STUDENT IN AN ORGANIZED HEALTH CARE EDUCATION/TRAINING PROGRAM

## 2021-01-01 PROCEDURE — 82140 ASSAY OF AMMONIA: CPT

## 2021-01-01 PROCEDURE — 83880 ASSAY OF NATRIURETIC PEPTIDE: CPT

## 2021-01-01 PROCEDURE — 94640 AIRWAY INHALATION TREATMENT: CPT

## 2021-01-01 PROCEDURE — 87088 URINE BACTERIA CULTURE: CPT

## 2021-01-01 PROCEDURE — 6360000002 HC RX W HCPCS: Performed by: STUDENT IN AN ORGANIZED HEALTH CARE EDUCATION/TRAINING PROGRAM

## 2021-01-01 PROCEDURE — A9577 INJ MULTIHANCE: HCPCS | Performed by: RADIOLOGY

## 2021-01-01 PROCEDURE — 87040 BLOOD CULTURE FOR BACTERIA: CPT

## 2021-01-01 PROCEDURE — 85610 PROTHROMBIN TIME: CPT

## 2021-01-01 PROCEDURE — 6360000002 HC RX W HCPCS: Performed by: NURSE PRACTITIONER

## 2021-01-01 PROCEDURE — 99284 EMERGENCY DEPT VISIT MOD MDM: CPT

## 2021-01-01 PROCEDURE — 85730 THROMBOPLASTIN TIME PARTIAL: CPT

## 2021-01-01 PROCEDURE — 36415 COLL VENOUS BLD VENIPUNCTURE: CPT

## 2021-01-01 PROCEDURE — 6370000000 HC RX 637 (ALT 250 FOR IP): Performed by: INTERNAL MEDICINE

## 2021-01-01 PROCEDURE — 6360000004 HC RX CONTRAST MEDICATION: Performed by: RADIOLOGY

## 2021-01-01 PROCEDURE — 93005 ELECTROCARDIOGRAM TRACING: CPT | Performed by: EMERGENCY MEDICINE

## 2021-01-01 PROCEDURE — 71045 X-RAY EXAM CHEST 1 VIEW: CPT

## 2021-01-01 PROCEDURE — 74183 MRI ABD W/O CNTR FLWD CNTR: CPT

## 2021-01-01 PROCEDURE — 6370000000 HC RX 637 (ALT 250 FOR IP): Performed by: NURSE PRACTITIONER

## 2021-01-01 PROCEDURE — 70450 CT HEAD/BRAIN W/O DYE: CPT

## 2021-01-01 PROCEDURE — 85025 COMPLETE CBC W/AUTO DIFF WBC: CPT

## 2021-01-01 RX ORDER — MORPHINE SULFATE 10 MG/5ML
5 SOLUTION ORAL EVERY 4 HOURS PRN
Status: DISCONTINUED | OUTPATIENT
Start: 2021-01-01 | End: 2021-01-01 | Stop reason: HOSPADM

## 2021-01-01 RX ORDER — LORAZEPAM 2 MG/ML
1 CONCENTRATE ORAL
Status: DISCONTINUED | OUTPATIENT
Start: 2021-01-01 | End: 2021-01-01 | Stop reason: HOSPADM

## 2021-01-01 RX ORDER — MORPHINE SULFATE 4 MG/ML
4 INJECTION, SOLUTION INTRAMUSCULAR; INTRAVENOUS
Status: DISCONTINUED | OUTPATIENT
Start: 2021-01-01 | End: 2021-01-01 | Stop reason: HOSPADM

## 2021-01-01 RX ORDER — SODIUM CHLORIDE 0.9 % (FLUSH) 0.9 %
5-40 SYRINGE (ML) INJECTION PRN
Status: DISCONTINUED | OUTPATIENT
Start: 2021-01-01 | End: 2021-01-01 | Stop reason: HOSPADM

## 2021-01-01 RX ORDER — POLYETHYLENE GLYCOL 3350 17 G/17G
17 POWDER, FOR SOLUTION ORAL DAILY PRN
Qty: 527 G | Refills: 1 | Status: SHIPPED | OUTPATIENT
Start: 2021-01-01 | End: 2021-10-07

## 2021-01-01 RX ORDER — ONDANSETRON 2 MG/ML
4 INJECTION INTRAMUSCULAR; INTRAVENOUS EVERY 6 HOURS PRN
Status: DISCONTINUED | OUTPATIENT
Start: 2021-01-01 | End: 2021-01-01 | Stop reason: HOSPADM

## 2021-01-01 RX ORDER — SODIUM CHLORIDE 0.9 % (FLUSH) 0.9 %
5-40 SYRINGE (ML) INJECTION EVERY 12 HOURS SCHEDULED
Status: DISCONTINUED | OUTPATIENT
Start: 2021-01-01 | End: 2021-01-01 | Stop reason: SDUPTHER

## 2021-01-01 RX ORDER — SCOLOPAMINE TRANSDERMAL SYSTEM 1 MG/1
1 PATCH, EXTENDED RELEASE TRANSDERMAL
Qty: 10 PATCH | Refills: 0 | Status: SHIPPED | OUTPATIENT
Start: 2021-01-01

## 2021-01-01 RX ORDER — ACETAMINOPHEN 650 MG/1
650 SUPPOSITORY RECTAL EVERY 6 HOURS PRN
Status: DISCONTINUED | OUTPATIENT
Start: 2021-01-01 | End: 2021-01-01 | Stop reason: HOSPADM

## 2021-01-01 RX ORDER — SODIUM CHLORIDE 9 MG/ML
25 INJECTION, SOLUTION INTRAVENOUS PRN
Status: DISCONTINUED | OUTPATIENT
Start: 2021-01-01 | End: 2021-01-01 | Stop reason: HOSPADM

## 2021-01-01 RX ORDER — NICOTINE 21 MG/24HR
1 PATCH, TRANSDERMAL 24 HOURS TRANSDERMAL DAILY
Status: DISCONTINUED | OUTPATIENT
Start: 2021-01-01 | End: 2021-01-01 | Stop reason: HOSPADM

## 2021-01-01 RX ORDER — PANTOPRAZOLE SODIUM 40 MG/1
40 TABLET, DELAYED RELEASE ORAL
Status: DISCONTINUED | OUTPATIENT
Start: 2021-01-01 | End: 2021-01-01 | Stop reason: HOSPADM

## 2021-01-01 RX ORDER — LORAZEPAM 0.5 MG/1
0.5 TABLET ORAL EVERY 6 HOURS PRN
Status: DISCONTINUED | OUTPATIENT
Start: 2021-01-01 | End: 2021-01-01 | Stop reason: HOSPADM

## 2021-01-01 RX ORDER — ACETAMINOPHEN 325 MG/1
650 TABLET ORAL EVERY 6 HOURS PRN
Status: DISCONTINUED | OUTPATIENT
Start: 2021-01-01 | End: 2021-01-01 | Stop reason: HOSPADM

## 2021-01-01 RX ORDER — SODIUM CHLORIDE 9 MG/ML
25 INJECTION, SOLUTION INTRAVENOUS PRN
Status: DISCONTINUED | OUTPATIENT
Start: 2021-01-01 | End: 2021-01-01 | Stop reason: SDUPTHER

## 2021-01-01 RX ORDER — SERTRALINE HYDROCHLORIDE 100 MG/1
100 TABLET, FILM COATED ORAL DAILY
Status: DISCONTINUED | OUTPATIENT
Start: 2021-01-01 | End: 2021-01-01 | Stop reason: HOSPADM

## 2021-01-01 RX ORDER — MORPHINE SULFATE 20 MG/ML
5 SOLUTION ORAL EVERY 4 HOURS PRN
Qty: 30 ML | Refills: 0 | Status: SHIPPED | OUTPATIENT
Start: 2021-01-01 | End: 2021-09-27

## 2021-01-01 RX ORDER — CALCIUM CARBONATE 200(500)MG
500 TABLET,CHEWABLE ORAL 3 TIMES DAILY PRN
Status: DISCONTINUED | OUTPATIENT
Start: 2021-01-01 | End: 2021-01-01 | Stop reason: HOSPADM

## 2021-01-01 RX ORDER — ARFORMOTEROL TARTRATE 15 UG/2ML
15 SOLUTION RESPIRATORY (INHALATION) 2 TIMES DAILY
Status: DISCONTINUED | OUTPATIENT
Start: 2021-01-01 | End: 2021-01-01 | Stop reason: HOSPADM

## 2021-01-01 RX ORDER — POLYETHYLENE GLYCOL 3350 17 G/17G
17 POWDER, FOR SOLUTION ORAL DAILY PRN
Status: DISCONTINUED | OUTPATIENT
Start: 2021-01-01 | End: 2021-01-01 | Stop reason: HOSPADM

## 2021-01-01 RX ORDER — OXYCODONE HYDROCHLORIDE AND ACETAMINOPHEN 5; 325 MG/1; MG/1
1 TABLET ORAL EVERY 6 HOURS PRN
Status: DISCONTINUED | OUTPATIENT
Start: 2021-01-01 | End: 2021-01-01

## 2021-01-01 RX ORDER — SCOLOPAMINE TRANSDERMAL SYSTEM 1 MG/1
1 PATCH, EXTENDED RELEASE TRANSDERMAL
Status: DISCONTINUED | OUTPATIENT
Start: 2021-01-01 | End: 2021-01-01 | Stop reason: HOSPADM

## 2021-01-01 RX ORDER — SODIUM CHLORIDE 0.9 % (FLUSH) 0.9 %
5-40 SYRINGE (ML) INJECTION PRN
Status: DISCONTINUED | OUTPATIENT
Start: 2021-01-01 | End: 2021-01-01 | Stop reason: SDUPTHER

## 2021-01-01 RX ORDER — SODIUM CHLORIDE 0.9 % (FLUSH) 0.9 %
5-40 SYRINGE (ML) INJECTION EVERY 12 HOURS SCHEDULED
Status: DISCONTINUED | OUTPATIENT
Start: 2021-01-01 | End: 2021-01-01 | Stop reason: HOSPADM

## 2021-01-01 RX ORDER — BUDESONIDE 0.25 MG/2ML
0.25 INHALANT ORAL 2 TIMES DAILY
Status: DISCONTINUED | OUTPATIENT
Start: 2021-01-01 | End: 2021-01-01 | Stop reason: HOSPADM

## 2021-01-01 RX ORDER — MORPHINE SULFATE 2 MG/ML
2 INJECTION, SOLUTION INTRAMUSCULAR; INTRAVENOUS
Status: DISCONTINUED | OUTPATIENT
Start: 2021-01-01 | End: 2021-01-01 | Stop reason: HOSPADM

## 2021-01-01 RX ORDER — MORPHINE SULFATE 4 MG/ML
4 INJECTION, SOLUTION INTRAMUSCULAR; INTRAVENOUS
Status: DISCONTINUED | OUTPATIENT
Start: 2021-01-01 | End: 2021-01-01

## 2021-01-01 RX ORDER — ONDANSETRON 4 MG/1
4 TABLET, ORALLY DISINTEGRATING ORAL EVERY 8 HOURS PRN
Status: DISCONTINUED | OUTPATIENT
Start: 2021-01-01 | End: 2021-01-01 | Stop reason: HOSPADM

## 2021-01-01 RX ORDER — MORPHINE SULFATE 2 MG/ML
2 INJECTION, SOLUTION INTRAMUSCULAR; INTRAVENOUS
Status: DISCONTINUED | OUTPATIENT
Start: 2021-01-01 | End: 2021-01-01

## 2021-01-01 RX ORDER — LORAZEPAM 0.5 MG/1
0.5 TABLET ORAL EVERY 6 HOURS PRN
Qty: 20 TABLET | Refills: 0 | Status: SHIPPED | OUTPATIENT
Start: 2021-01-01 | End: 2021-01-01

## 2021-01-01 RX ADMIN — IPRATROPIUM BROMIDE 0.5 MG: 0.5 SOLUTION RESPIRATORY (INHALATION) at 15:39

## 2021-01-01 RX ADMIN — CALCIUM CARBONATE (ANTACID) CHEW TAB 500 MG 500 MG: 500 CHEW TAB at 12:20

## 2021-01-01 RX ADMIN — BUDESONIDE 250 MCG: 0.25 SUSPENSION RESPIRATORY (INHALATION) at 12:22

## 2021-01-01 RX ADMIN — PANTOPRAZOLE SODIUM 40 MG: 40 TABLET, DELAYED RELEASE ORAL at 06:26

## 2021-01-01 RX ADMIN — ARFORMOTEROL TARTRATE 15 MCG: 15 SOLUTION RESPIRATORY (INHALATION) at 19:50

## 2021-01-01 RX ADMIN — PANTOPRAZOLE SODIUM 40 MG: 40 TABLET, DELAYED RELEASE ORAL at 06:12

## 2021-01-01 RX ADMIN — LORAZEPAM 1 MG: 2 SOLUTION, CONCENTRATE ORAL at 09:04

## 2021-01-01 RX ADMIN — LORAZEPAM 1 MG: 2 SOLUTION, CONCENTRATE ORAL at 16:44

## 2021-01-01 RX ADMIN — Medication 10 ML: at 08:26

## 2021-01-01 RX ADMIN — IOPAMIDOL 90 ML: 755 INJECTION, SOLUTION INTRAVENOUS at 21:59

## 2021-01-01 RX ADMIN — IPRATROPIUM BROMIDE 0.5 MG: 0.5 SOLUTION RESPIRATORY (INHALATION) at 12:23

## 2021-01-01 RX ADMIN — ARFORMOTEROL TARTRATE 15 MCG: 15 SOLUTION RESPIRATORY (INHALATION) at 12:22

## 2021-01-01 RX ADMIN — Medication 10 ML: at 08:29

## 2021-01-01 RX ADMIN — GADOBENATE DIMEGLUMINE 20 ML: 529 INJECTION, SOLUTION INTRAVENOUS at 20:42

## 2021-01-01 RX ADMIN — SERTRALINE 100 MG: 100 TABLET, FILM COATED ORAL at 08:26

## 2021-01-01 RX ADMIN — Medication 10 ML: at 20:54

## 2021-01-01 RX ADMIN — OXYCODONE HYDROCHLORIDE AND ACETAMINOPHEN 1 TABLET: 5; 325 TABLET ORAL at 02:01

## 2021-01-01 RX ADMIN — IPRATROPIUM BROMIDE 0.5 MG: 0.5 SOLUTION RESPIRATORY (INHALATION) at 19:51

## 2021-01-01 RX ADMIN — MORPHINE SULFATE 4 MG: 4 INJECTION, SOLUTION INTRAMUSCULAR; INTRAVENOUS at 10:54

## 2021-01-01 RX ADMIN — BUDESONIDE 250 MCG: 0.25 SUSPENSION RESPIRATORY (INHALATION) at 19:50

## 2021-01-01 RX ADMIN — MORPHINE SULFATE 4 MG: 4 INJECTION, SOLUTION INTRAMUSCULAR; INTRAVENOUS at 17:19

## 2021-01-01 RX ADMIN — SERTRALINE 100 MG: 100 TABLET, FILM COATED ORAL at 08:29

## 2021-01-01 ASSESSMENT — PAIN DESCRIPTION - ORIENTATION
ORIENTATION: RIGHT
ORIENTATION: RIGHT

## 2021-01-01 ASSESSMENT — PAIN SCALES - GENERAL
PAINLEVEL_OUTOF10: 8
PAINLEVEL_OUTOF10: 7
PAINLEVEL_OUTOF10: 8
PAINLEVEL_OUTOF10: 0
PAINLEVEL_OUTOF10: 0
PAINLEVEL_OUTOF10: 8
PAINLEVEL_OUTOF10: 2

## 2021-01-01 ASSESSMENT — PAIN DESCRIPTION - FREQUENCY
FREQUENCY: INTERMITTENT
FREQUENCY: INTERMITTENT

## 2021-01-01 ASSESSMENT — PAIN DESCRIPTION - DESCRIPTORS
DESCRIPTORS: SHARP;SHOOTING;SORE
DESCRIPTORS: SHARP;SORE;DISCOMFORT

## 2021-01-01 ASSESSMENT — PAIN DESCRIPTION - LOCATION
LOCATION: FLANK
LOCATION: FLANK

## 2021-01-01 ASSESSMENT — PAIN - FUNCTIONAL ASSESSMENT: PAIN_FUNCTIONAL_ASSESSMENT: PREVENTS OR INTERFERES SOME ACTIVE ACTIVITIES AND ADLS

## 2021-01-01 ASSESSMENT — PAIN DESCRIPTION - ONSET: ONSET: UNABLE TO TELL

## 2021-01-01 ASSESSMENT — PAIN DESCRIPTION - PAIN TYPE
TYPE: CHRONIC PAIN
TYPE: CHRONIC PAIN

## 2021-09-05 PROBLEM — R62.7 FAILURE TO THRIVE IN ADULT: Status: ACTIVE | Noted: 2021-01-01

## 2021-09-05 PROBLEM — F32.A DEPRESSION: Status: ACTIVE | Noted: 2021-01-01

## 2021-09-05 PROBLEM — C22.0 CANCER, HEPATOCELLULAR (HCC): Status: ACTIVE | Noted: 2021-01-01

## 2021-09-05 PROBLEM — E11.9 DM2 (DIABETES MELLITUS, TYPE 2) (HCC): Status: ACTIVE | Noted: 2021-01-01

## 2021-09-05 NOTE — ED PROVIDER NOTES
HPI:  9/5/21,   Time: 1:53 PM EDT         Krystin Eng is a 57 y.o. male presenting to the ED for mental status increased peripheral edema and desire for possible transition to hospice care, beginning days ago. The complaint has been intermittent, moderate in severity, and worsened by nothing. Patient has been noncompliant with his medications. Patient denies shortness of breath chest pain abdominal pain nausea or vomiting. Patient's wife states that he has nonalcoholic cirrhosis and liver cancer and that he has had 2 rounds of TACE but is now interested in discontinuing aggressive therapy and in initiating hospice therapy. The wife states the patient becomes progressively more confused throughout the day. ROS:   Pertinent positives and negatives are stated within HPI, all other systems reviewed and are negative.  --------------------------------------------- PAST HISTORY ---------------------------------------------  Past Medical History:  has a past medical history of Chronic back pain, Cirrhosis, non-alcoholic ([facility] COPD (chronic obstructive pulmonary disease) ([facility] [address] Emphysema of lung ([facility] [facility] Headache, History of blood disorder, Migraine, Neck pain, Neuropathy, Numbness and tingling, Pain in both lower legs, Shaking, and Stroke (cerebrum) ([facility] [address] Past Surgical History:  has a past surgical history that includes Cholecystectomy (04/2015). Social History:  reports that he has been smoking. He has been smoking about 0.50 packs per day. He has never used smokeless tobacco. He reports that he does not drink alcohol and does not use drugs. Family History: family history includes Alzheimer's Disease in his mother; Arthritis in his father; Breast Cancer in his mother; Diabetes in his father; Heart Disease in his father. The patients home medications have been reviewed.     Allergies: Chocolate, Grass extracts [gramineae pollens], and Carafate [sucralfate]    -------------------------------------------------- RESULTS -------------------------------------------------  All laboratory and radiology results have been personally reviewed by myself   LABS:  Results for orders placed or performed during the hospital encounter of 09/05/21   CBC Auto Differential   Result Value Ref Range    WBC 8.6 4.5 - 11.5 E9/L    RBC 2.70 (L) 3.80 - 5.80 E12/L    Hemoglobin 9.7 (L) 12.5 - 16.5 g/dL    Hematocrit 28.8 (L) 37.0 - 54.0 %    .7 (H) 80.0 - 99.9 fL    MCH 35.9 (H) 26.0 - 35.0 pg    MCHC 33.7 32.0 - 34.5 %    RDW 16.1 (H) 11.5 - 15.0 fL    Platelets 87 (L) 337 - 450 E9/L    MPV 9.9 7.0 - 12.0 fL    Neutrophils % 77.4 43.0 - 80.0 %    Lymphocytes % 13.9 (L) 20.0 - 42.0 %    Monocytes % 3.5 2.0 - 12.0 %    Eosinophils % 4.3 0.0 - 6.0 %    Basophils % 0.8 0.0 - 2.0 %    Neutrophils Absolute 6.62 1.80 - 7.30 E9/L    Lymphocytes Absolute 1.20 (L) 1.50 - 4.00 E9/L    Monocytes Absolute 0.34 0.10 - 0.95 E9/L    Eosinophils Absolute 0.37 0.05 - 0.50 E9/L    Basophils Absolute 0.00 0.00 - 0.20 E9/L    Blasts Relative 0.9 0 - 0 %    Anisocytosis 2+    Comprehensive Metabolic Panel w/ Reflex to MG   Result Value Ref Range    Sodium 139 132 - 146 mmol/L    Potassium reflex Magnesium 3.7 3.5 - 5.0 mmol/L    Chloride 104 98 - 107 mmol/L    CO2 26 22 - 29 mmol/L    Anion Gap 9 7 - 16 mmol/L    Glucose 145 (H) 74 - 99 mg/dL    BUN 27 (H) 6 - 23 mg/dL    CREATININE 1.2 0.7 - 1.2 mg/dL    GFR Non-African American >60 >=60 mL/min/1.73    GFR African American >60     Calcium 9.0 8.6 - 10.2 mg/dL    Total Protein 5.5 (L) 6.4 - 8.3 g/dL    Albumin 2.7 (L) 3.5 - 5.2 g/dL    Total Bilirubin 3.3 (H) 0.0 - 1.2 mg/dL    Alkaline Phosphatase 248 (H) 40 - 129 U/L    ALT 27 0 - 40 U/L    AST 70 (H) 0 - 39 U/L   Brain Natriuretic Peptide   Result Value Ref Range    Pro- (H) 0 - 125 pg/mL   Protime-INR   Result Value Ref Range    Protime 16.3 (H) 9.3 - 12.4 sec    INR 1.5    APTT CT HEAD WO CONTRAST   Final Result   No acute intracranial abnormality. Specifically, there is no acute   intracranial hemorrhage. XR CHEST PORTABLE   Final Result   No acute process. ------------------------- NURSING NOTES AND VITALS REVIEWED ---------------------------   The nursing notes within the ED encounter and vital signs as below have been reviewed. BP (!) 109/55   Pulse 91   Temp 97.9 °F (36.6 °C) (Temporal)   Resp 20   Ht 6' (1.829 m)   Wt 236 lb 8 oz (107.3 kg)   SpO2 95%   BMI 32.08 kg/m²   Oxygen Saturation Interpretation: Normal      ---------------------------------------------------PHYSICAL EXAM--------------------------------------      Constitutional/General: Alert and oriented x3, well appearing, non toxic in NAD  Head: NC/AT  Eyes: PERRL, EOMI mild scleral icterus  Mouth: Oropharynx clear, handling secretions, no trismus  Neck: Supple, full ROM, no meningeal signs  Pulmonary: Lungs clear to auscultation bilaterally, no wheezes, rales, or rhonchi. Not in respiratory distress  Cardiovascular:  Regular rate and rhythm, no murmurs, gallops, or rubs. 2+ distal pulses  Abdomen: Soft, non tender, non distended, ascites is noted  Extremities: Moves all extremities x 4.  Warm and well perfused 3+ edema bilateral lower extremities  Skin: warm and dry without rash  Neurologic: GCS 15, cranial nerves intact 5+ strength  Psych: Normal Affect      ------------------------------ ED COURSE/MEDICAL DECISION MAKING----------------------  Medications   sertraline (ZOLOFT) tablet 100 mg (has no administration in time range)   pantoprazole (PROTONIX) tablet 40 mg (40 mg Oral Given 9/6/21 0612)   sodium chloride flush 0.9 % injection 5-40 mL (has no administration in time range)   sodium chloride flush 0.9 % injection 5-40 mL (has no administration in time range)   0.9 % sodium chloride infusion (has no administration in time range)   ondansetron (ZOFRAN-ODT) disintegrating tablet 4 mg (has no administration in time range)     Or   ondansetron (ZOFRAN) injection 4 mg (has no administration in time range)   polyethylene glycol (GLYCOLAX) packet 17 g (has no administration in time range)   acetaminophen (TYLENOL) tablet 650 mg (has no administration in time range)     Or   acetaminophen (TYLENOL) suppository 650 mg (has no administration in time range)   morphine (PF) injection 2 mg (has no administration in time range)     Or   morphine sulfate (PF) injection 4 mg (has no administration in time range)   nicotine (NICODERM CQ) 21 MG/24HR 1 patch (has no administration in time range)   oxyCODONE-acetaminophen (PERCOCET) 5-325 MG per tablet 1 tablet (1 tablet Oral Given 9/6/21 0201)   budesonide (PULMICORT) nebulizer suspension 250 mcg (250 mcg Nebulization Not Given 9/6/21 0159)     And   Arformoterol Tartrate (BROVANA) nebulizer solution 15 mcg (15 mcg Nebulization Not Given 9/6/21 0200)   ipratropium (ATROVENT) 0.02 % nebulizer solution 0.5 mg (has no administration in time range)   iopamidol (ISOVUE-370) 76 % injection 90 mL (90 mLs IntraVENous Given 9/5/21 2159)         Medical Decision Making: In the emergency department the patient was in no distress. He wishes to become a hospice patient. He is incapable of managing his ADLs at this time and is interested in potential placement. Wife states he is having intermittent confusion but at this time the patient is awake alert and oriented and does not wish any aggressive measures but does need help with normal activities and for that reason they are seeking admission    Counseling: The emergency provider has spoken with the patient and family member patient and wife and discussed todays results, in addition to providing specific details for the plan of care and counseling regarding the diagnosis and prognosis.   Questions are answered at this time and they are agreeable with the plan.      --------------------------------- IMPRESSION AND

## 2021-09-05 NOTE — ED NOTES
Bed: 16  Expected date: 9/5/21  Expected time:   Means of arrival: [facility] Department  Comments:  Queenie Cardoza, [address]  09/05/21 4568

## 2021-09-06 NOTE — PROGRESS NOTES
Palliative Medicine   Progression of Care     Patient's chart reviewed. Will see what transpires after the meeting with Judy Soares. Will review patient with [facility] liaison. 1640-Patient to go home tomorrow with [facility]osp[facility]. No need for complete Palliative Care Consult.      Palliative Care following closely for support of patient and family   Jolene Morales APRN-CNP, AGACNP-BC

## 2021-09-06 NOTE — CONSULTS
Palliative Medicine   Progression of Care     Patient for tentative discharge home tomorrow with [facility].      Palliative Care following closely for support of patient and family   Susy GRANADOS-CATHY, AGACNP-BC

## 2021-09-06 NOTE — ACP (ADVANCE CARE PLANNING)
ADVANCED CARE PLANNING    Patient Name: Isaias Porras       YOB: 1960              MRN:    [id number]  Admission Date:  9/5/2021  1:22 PM      Active Diagnoses:  Principal Problem:    Failure to thrive in adult  Active Problems:    Cancer, hepatocellular ([facility]    DM2 (diabetes mellitus, type 2) ([facility]    Depression  Resolved Problems:    * No resolved hospital problems. *      These active diagnoses are of sufficient risk that focused discussion on advanced care planning is indicated in order to allow the patient to thoughtfully consider personal goals of care; and, if situations arise that prevent the patient to personally give input, to ensure appropriate representation of their personal desire for different levels and levels of care. Person(s) present in discussion: Isaias PorrasGloria MD, Family members: Ace Torres ([phone])    Discussion: I reviewed his admission for the above diagnoses and his desires for ongoing aggresive care, including potential intubation and mechanical ventilation; also discussed who would speak on his behalf should he be unable to do so and discussed what conversations he has had with his family so they understand his desires if such a situation occurred now or in the future. PLAN:  Code Status:  At this time patient wishes to be DNR- Comfort Care      Time Spent on Advanced Planning Documents: 18 minutes    Gloria Nash MD  [address] [place] [address]

## 2021-09-06 NOTE — PROGRESS NOTES
This RN spoke with pt regarding hospice. Pt stated that he does not have a preference as to which hospice sees him. Called hospice [facility].

## 2021-09-06 NOTE — H&P
Hospitalist History & Physical      PCP: Cris Tellez MD    Date of Admission: 2021    Date of Service: Pt seen/examined on 2021 and is admitted to Inpatient with expected LOS greater than two midnights due to medical therapy. Chief Complaint:  had concerns including Altered Mental Status (liver ca, with BLE edema and AMS). History Of Present Illness:    Mr. Soha Juarez, a 57y.o. year old male  who  has a past medical history of Chronic back pain, Cirrhosis, non-alcoholic ([facility] COPD (chronic obstructive pulmonary disease) ([facility] Emphysema of lung ([facility] Headache, History of blood disorder, Migraine, Neck pain, Neuropathy, Numbness and tingling, Pain in both lower legs, Shaking, and Stroke (cerebrum) ([facility] On my encounter pt is AAO x 4, following all commands in NAD. Pt [facility] (s/p radiation pellets and 2 rounds of chemo per pt), who presents with decreasing PO intake, weight loss, generalized fatigue and malaise and inability to care for himself. Pt states he has stopped his chemotherapy and all home medications about 1-2 months ago, and he states he no longer want to be treated for cancer and wishes to be placed in Hospice. Pt lives with his wife and has become completely dependent on ADL's. He states since stopping his medications he has noticed increasing bilateral lower extremity edema, however, denies any pain. He denies any fevers, chills, nausea, vomiting, diarrhea, night sweats, headaches, chest pain, palpitations, SOB, abd pain, flank pain, dysuria, or numbness or tingling in his extremities. In ED, pt was afebrile, and hemodynamically stable. CMP significant for mild hyperglycemia (g). PBNP of 630. ALP of 248, AST:ALT of 70:27. CBC with macrocytic anemia and thrombocytopenia (plt: 87). UA negative for UTI. CTH and CXR negative for acute process. Pt admitted for further management.     Past Medical History:        Diagnosis Date  Chronic back pain     Cirrhosis, non-alcoholic (HCC)     COPD (chronic obstructive pulmonary disease) (HCC)     Emphysema of lung (HCC)     Headache     5 per month, 4/10 on the pain scale    History of blood disorder     Migraine     1 per month, 7/10 on the pain scale    Neck pain     Neuropathy     Numbness and tingling     feet and hands    Pain in both lower legs     Shaking     Stroke (cerebrum) [facility]) 1997       Past Surgical History:        Procedure Laterality Date    [facility]       Medications Prior to Admission:      Prior to Admission medications    Medication Sig Start Date End Date Taking? Authorizing Provider   gabapentin (NEURONTIN) 300 MG capsule Take 1 capsule by mouth 4 times daily for 30 days. . 8/24/18 9/23/18  Levi Hernandez MD   b complex-C-folic acid (NEPHROCAPS) 1 MG capsule Take 1 capsule by mouth daily 8/24/18   Levi Hernandez MD   Prenatal Vit-Fe Fumarate-FA (PNV PRENATAL PLUS MULTIVITAMIN) 27-1 MG TABS Take 1 tablet by mouth daily 7/26/18   Levi Hernandez MD   metFORMIN (GLUCOPHAGE) 500 MG tablet Take 1,000 mg by mouth 2 times daily (with meals)    Historical Provider, MD   sertraline (ZOLOFT) 100 MG tablet Take 100 mg by mouth daily    Historical Provider, MD   pravastatin (PRAVACHOL) 20 MG tablet Take 20 mg by mouth daily    Historical Provider, MD   nadolol (CORGARD) 20 MG tablet Take 20 mg by mouth daily    Historical Provider, MD   pantoprazole sodium (PROTONIX) 40 MG PACK packet Take 40 mg by mouth every morning (before breakfast)    Historical Provider, MD   vitamin B-6 (PYRIDOXINE) 100 MG tablet Take 100 mg by mouth daily    Historical Provider, MD   FOLIC ACID PO Take 40 mcg by mouth daily    Historical Provider, MD   vitamin B-12 (CYANOCOBALAMIN) 500 MCG tablet Take 500 mcg by mouth 2 times daily    Historical Provider, MD   Cholecalciferol (D3-1000 PO) Take 1 tablet by mouth daily    Historical Provider, MD   vitamin E 400 UNIT capsule Take 400 Units by mouth daily    Historical Provider, MD   sildenafil (REVATIO) 20 MG tablet Take 20 mg by mouth as needed    Historical Provider, MD   Fluticasone-Salmeterol (ADVAIR DISKUS IN) Inhale into the lungs as needed    Historical Provider, MD   Tiotropium Bromide Monohydrate ([place] Passy IN) Inhale into the lungs as needed    Historical Provider, MD   rifaximin (XIFAXAN) 550 MG tablet Take 550 mg by mouth 2 times daily    Historical Provider, MD   Diphenhydramine-APAP, sleep, (TYLENOL PM EXTRA STRENGTH PO) Take 2 tablets by mouth as needed    Historical Provider, MD       Allergies:  Chocolate, Grass extracts [gramineae pollens], and Carafate [sucralfate]    Social History:    TOBACCO:   reports that he has been smoking. He has been smoking about 0.50 packs per day. He has never used smokeless tobacco.  ETOH:   reports no history of alcohol use. Family History:    Reviewed in detail and negative for DM, CAD, Cancer, CVA. Positive as follows\"      Problem Relation Age of Onset    Breast Cancer Mother     Alzheimer's Disease Mother     Heart Disease Father     Diabetes Father     Arthritis Father        REVIEW OF SYSTEMS:   Pertinent positives as noted in the HPI. All other systems reviewed and negative. PHYSICAL EXAM:  /66   Pulse 84   Temp 98.1 °F (36.7 °C) (Temporal)   Resp 16   Ht 6' (1.829 m)   Wt 235 lb (106.6 kg)   SpO2 96%   BMI 31.87 kg/m²   General appearance: No apparent distress, appears stated age and cooperative. Chronically ill appearing pt. Obese male. HEENT: Normal cephalic, atraumatic without obvious deformity. Pupils equal, round, and reactive to light. Extra ocular muscles intact. Conjunctivae/corneas clear. Temporal wasting  Neck: Supple, with full range of motion. No jugular venous distention. Trachea midline. Respiratory: CTA B/L. No rhonchi, Rales, Crackles  Cardiovascular: N S1/S2.  No MRG  Abdomen: Soft, NTTP, + BS  Musculoskeletal: No clubbing, cyanosis, edema of bilateral lower extremities. Brisk capillary refill. Skin: Normal skin color. No rashes or lesions. Neurologic:  Neurovascularly intact without any focal sensory/motor deficits. Cranial nerves: II-XII intact, grossly non-focal.  Psychiatric: Alert and oriented, thought content appropriate, normal insight    Reviewed EKG and CXR personally      CBC:   Recent Labs     09/05/21  1529   WBC 8.6   RBC 2.70*   HGB 9.7*   HCT 28.8*   .7*   RDW 16.1*   PLT 87*     BMP:   Recent Labs     09/05/21  1529      K 3.7      CO2 26   BUN 27*   CREATININE 1.2     LFT:  Recent Labs     09/05/21  1529   PROT 5.5*   ALKPHOS 248*   ALT 27   AST 70*   BILITOT 3.3*     CE:  No results for input(s): Juan M Beery in the last 72 hours. PT/INR:   Recent Labs     09/05/21  1529   INR 1.5   APTT 29.8     BNP: No results for input(s): BNP in the last 72 hours.   ESR:   Lab Results   Component Value Date    SEDRATE 28 (H) 02/25/2021     CRP: No results found for: CRP  D Dimer: No results found for: DDIMER   Folate and B12:   Lab Results   Component Value Date    SXGVOUNG99 1617 (H) 11/18/2020   ,   Lab Results   Component Value Date    FOLATE 7.1 11/18/2020     Lactic Acid: No results found for: LACTA  Thyroid Studies:   Lab Results   Component Value Date    TSH 1.950 11/18/2020       Oupatient labs:  Lab Results   Component Value Date    CHOL 185 06/09/2020    TRIG 114 06/09/2020    HDL 46 11/18/2020    LDLCALC 107 (H) 11/18/2020    TSH 1.950 11/18/2020    PSA 0.06 02/25/2021    INR 1.5 09/05/2021    LABA1C 5.7 (H) 02/25/2021     Urinalysis:    Lab Results   Component Value Date    NITRU Negative 09/05/2021    BLOODU Negative 09/05/2021    SPECGRAV >=1.030 09/05/2021    GLUCOSEU Negative 09/05/2021     Imaging:  CT HEAD WO CONTRAST    Result Date: 9/5/2021  EXAMINATION: CT OF THE HEAD WITHOUT CONTRAST  9/5/2021 4:24 pm TECHNIQUE: CT of the head was performed without the administration of intravenous contrast. Dose modulation, iterative reconstruction, and/or weight based adjustment of the mA/kV was utilized to reduce the radiation dose to as low as reasonably achievable. COMPARISON: None. HISTORY: ORDERING SYSTEM PROVIDED HISTORY: ams TECHNOLOGIST PROVIDED HISTORY: Has a \"code stroke\" or \"stroke alert\" been called? ->No Reason for exam:->ams Decision Support Exception - unselect if not a suspected or confirmed emergency medical condition->Emergency Medical Condition (MA) What reading provider will be dictating this exam?->CRC FINDINGS: BRAIN/VENTRICLES: There is no acute intracranial hemorrhage, mass effect or midline shift. No abnormal extra-axial fluid collection. The gray-white differentiation is maintained without evidence of an acute infarct. There is no evidence of hydrocephalus. ORBITS: The visualized portion of the orbits demonstrate no acute abnormality. SINUSES: The visualized paranasal sinuses and mastoid air cells demonstrate no acute abnormality. SOFT TISSUES/SKULL:  No acute abnormality of the visualized skull or soft tissues. No acute intracranial abnormality. Specifically, there is no acute intracranial hemorrhage. XR CHEST PORTABLE    Result Date: 9/5/2021  EXAMINATION: ONE XRAY VIEW OF THE CHEST 9/5/2021 2:02 pm COMPARISON: 11/18/2020 HISTORY: ORDERING SYSTEM PROVIDED HISTORY: chf TECHNOLOGIST PROVIDED HISTORY: Reason for exam:->chf What reading provider will be dictating this exam?->CRC FINDINGS: The lungs are without acute focal process. There is no effusion or pneumothorax. The cardiomediastinal silhouette is without acute process. The osseous structures are without acute process. No acute process. ASSESSMENT:  Principal Problem:    Failure to thrive in adult  Active Problems:    Cancer, hepatocellular ([facility]    DM2 (diabetes mellitus, type 2) ([facility]    Depression  Resolved Problems:    * No resolved hospital problems. *    PLAN:  - Pt is AAO x 4, follows commands and is of sound mind and judgement. - Pt is requesting to be made [facility], his wife Jessy Andres) is also in agreement with pts wishes.  - Pt refusing further management for his chronic medical co-morbidities or active [facility]  - He does not want any blood draws or further work up  - Unknown staging of [facility] however, pt states he no longer wants any oncologic intervention. He has already been refusing chemotherapy and his home medications. - Will consult Palliative care and Hospice  - Cont with Pain management  - Pt denies any HI or SI, he states he is just tired of \"fighting cancer\"    Diet: Diabetic diet  Code Status: DNR-CC  Surrogate decision maker confirmed with patient:   Extended Emergency Contact Information  Primary Emergency Contact: Priscilla Cameron  Address: [address] [place] [address], [address] [place] [address] Phone: [phone]  Mobile Phone: [phone]  Relation: Spouse    DVT Prophylaxis: []Lovenox []Heparin [x]PCD [] [address] []Encouraged ambulation  Disposition: [x]Med/Surg [] Intermediate [] ICU/CCU  Admit status: [] Observation [x] Inpatient     +++++++++++++++++++++++++++++++++++++++++++++++++  Ruthie Maradiaga MD  [address]  +++++++++++++++++++++++++++++++++++++++++++++++++  NOTE: This report was transcribed using voice recognition software. Every effort was made to ensure accuracy; however, inadvertent computerized transcription errors may be present.

## 2021-09-06 NOTE — PROGRESS NOTES
Pt is complaining of heart burn. pt was given Protonix this am. Pt does not have anything else ordered. Pt is asking for TUMS. Messaged Dr Tali Ruiz regarding this. Awaiting response or orders.

## 2021-09-06 NOTE — PLAN OF CARE
Problem: Pain:  Goal: Pain level will decrease  Description: Pain level will decrease  Outcome: Met This Shift  Goal: Control of chronic pain  Description: Control of chronic pain  Outcome: Met This Shift     Problem: Falls - Risk of:  Goal: Will remain free from falls  Description: Will remain free from falls  Outcome: Met This Shift  Goal: Absence of physical injury  Description: Absence of physical injury  Outcome: Met This Shift

## 2021-09-06 NOTE — PROGRESS NOTES
Liaison Informational Visit Note      Referral received from [facility]             Patient Name: Marquita Sin   :  1960  MRN:  [id number]    [facility] date:  2021      Hospital Admitting Physician:  Gayatri López MD   PCP:  Phillip Martinez MD    Primary Insurance: Payor: Nuryiván Martha /  /  /      Emergency Contact:      Contact/Relation:   /         Phone:         Advance Directive  Advance directives received No  Patient has completed an advance directive   Discussed with: Family member  DPOA-HC Name-Relation:    Phone:       Terminal Diagnosis Liver Ca as confirmed by Dr. Egan Staff, [address] Problem List:   Patient Active Problem List   Diagnosis Code    Cancer, hepatocellular ([facility] C22.0    Failure to thrive in adult R62.7    DM2 (diabetes mellitus, type 2) ([facility] E11.9    Depression F32.9       Code Status Order: DNR-CC    Allergies:  Chocolate, Grass extracts [gramineae pollens], and Carafate [sucralfate]    Family Goal: comfort care    Meeting held with patient and spoke with wife Jeff Nieves over the phone. Referral received. Chart reviewed. Met with patient at the bedside. Patient is alert and oriented. Explained hospice philosophy and no longer pursuing any further aggressive treatment. Focusing on comfort care only. Explained hospice benefit and reviewed all levels of care including the hospice house for short term symptom management. Once symptoms are managed, patient would transfer to a routine level of care either home or ECF with hospice. Explained room and board is private pay at the facility. Discussed roles and frequency visits of skilled nursing, personal care team and SW. Patient stated he has accepted that hospice is best for him at this time. He knows he does not have much longer to live according to his physicians. Patient stated he made peace with it. He would like me to reach out to his wife Jeff Nieves and discuss hospice care with her.  He would like to go home with hospice services. Call placed to [facility]. Explained hospice philosophy and benefit. [facility] stated she is agreeable with patient and would like hospice care at home. [facility] expressed that they live in a trailer and will need to move things around this evening to be able to fit a hospital bed in the living room. She would like DME delivered out tomorrow. Address verified. Answered all questions relating to hospice care. Emotional support and active listening provided. Will follow up tomorrow and assist with discharge planning. [facility] plan:  1. Patient will discharge home tomorrow with hospice care. DME will be delivered out tomorrow. 2. Please call [facility] with any questions at [phone].         Discharge Plan:  Discharge Disposition; Home      Electronically signed by Hai Paul RN on 9/6/2021 at 3:42 PM

## 2021-09-06 NOTE — PROGRESS NOTES
Hospitalist Progress Note      SYNOPSIS: Patient admitted on 2021 for FTT. Pt presented with decreasing PO intake, weight loss, generalized fatigue and malaise and inability to care for himself. He has a PMH significant for [address] followed at [facility] (s/p radiation pellets and 2 rounds of chemo per pt). He has stopped his chemotherapy and all home medications about 1-2 months ago, and he states he no longer want to be treated for cancer and wishes to be placed in Hospice. Hospital day 1     SUBJECTIVE:  Stable overnight. No issues reported. Patient seen and examined. Complaining of heart burn. Otherwise, comfortable. Awaiting hospice consultation. Records reviewed. Temp (24hrs), Av.1 °F (36.7 °C), Min:97.9 °F (36.6 °C), Max:98.3 °F (36.8 °C)    DIET: ADULT DIET; Regular; 4 carb choices (60 gm/meal)  CODE: DNR-CC    Intake/Output Summary (Last 24 hours) at 2021 0828  Last data filed at 2021 0614  Gross per 24 hour   Intake 240 ml   Output 275 ml   Net -35 ml       Review of Systems: All bolded are positive; please see HPI  General:  Fever, chills, diaphoresis, fatigue, malaise, night sweats, weight loss  Psychological:  Anxiety, disorientation, hallucinations. ENT:  Epistaxis, headaches, vertigo, visual changes. Cardiovascular:  Chest pain, irregular heartbeats, palpitations, paroxysmal nocturnal dyspnea. Respiratory:  Shortness of breath, coughing, sputum production, hemoptysis, wheezing, orthopnea.   Gastrointestinal:  Nausea, vomiting, diarrhea, heartburn, constipation, abdominal pain, hematemesis, hematochezia, melena, acholic stools  Genito-Urinary:  Dysuria, urgency, frequency, hematuria  Musculoskeletal:  Joint pain, joint stiffness, joint swelling, muscle pain  Neurology:  Headache, focal neurological deficits, weakness, numbness, paresthesia  Derm:  Rashes, ulcers, excoriations, bruising  Extremities:  Decreased ROM, peripheral edema, mottling    OBJECTIVE:    BP (!) 119/55   Pulse 91   Temp 98.3 °F (36.8 °C) (Temporal)   Resp 18   Ht 6' (1.829 m)   Wt 236 lb 8 oz (107.3 kg)   SpO2 94%   BMI 32.08 kg/m²     General appearance: Middle aged male in no apparent distress, appears older than stated age, chronically ill, and frail. HEENT: Normal cephalic, atraumatic without obvious deformity. Pupils equal, round, and reactive to light. Extra ocular muscles intact. Conjunctivae/corneas clear. Neck: Supple, with full range of motion. No jugular venous distention. Trachea midline. Respiratory: CTA. No distress. Cardiovascular: N S1/S2. No M,R,G  Abdomen: Soft, NTTP, + BS  Musculoskeletal: No clubbing, cyanosis, edema of bilateral lower extremities. Brisk capillary refill. Skin: Normal skin color. No rashes or lesions. Neurologic:  Neurovascularly intact without any focal sensory/motor deficits.  Cranial nerves: II-XII intact, grossly non-focal.  Psychiatric: Alert and oriented, thought content appropriate, normal insight    Medications:  REVIEWED DAILY    Infusion Medications    sodium chloride       Scheduled Medications    ipratropium  0.5 mg Nebulization 4x daily    sertraline  100 mg Oral Daily    pantoprazole  40 mg Oral QAM AC    sodium chloride flush  5-40 mL IntraVENous 2 times per day    nicotine  1 patch TransDERmal Daily    budesonide  0.25 mg Nebulization BID    And    Arformoterol Tartrate  15 mcg Nebulization BID     PRN Meds: sodium chloride flush, sodium chloride, ondansetron **OR** ondansetron, polyethylene glycol, acetaminophen **OR** acetaminophen, morphine **OR** morphine, oxyCODONE-acetaminophen    Labs:     Recent Labs     09/05/21  1529   WBC 8.6   HGB 9.7*   HCT 28.8*   PLT 87*       Recent Labs     09/05/21  1529      K 3.7      CO2 26   BUN 27*   CREATININE 1.2   CALCIUM 9.0       Recent Labs     09/05/21  1529   PROT 5.5*   ALKPHOS 248*   ALT 27   AST 70*   BILITOT 3.3*       Recent Labs     09/05/21  1529   INR 1.5 Chronic labs:    Lab Results   Component Value Date    CHOL 185 06/09/2020    TRIG 114 06/09/2020    HDL 46 11/18/2020    LDLCALC 107 (H) 11/18/2020    TSH 1.950 11/18/2020    PSA 0.06 02/25/2021    INR 1.5 09/05/2021    LABA1C 5.7 (H) 02/25/2021       Radiology: REVIEWED DAILY    ASSESSMENT:  Failure to thrive in adult  Hepatocellular cancer  DM2  Depression  COPD  History of CVA    PLAN:  Await hospice consult  Comfort measures      DISCHARGE PLAN: TBD pending hospice consult    +++++++++++++++++++++++++++++++++++++++++++++++++  Arnie [address]  +++++++++++++++++++++++++++++++++++++++++++++++++  NOTE: This report was transcribed using voice recognition software. Every effort was made to ensure accuracy; however, inadvertent computerized transcription errors may be present.

## 2021-09-07 NOTE — PLAN OF CARE
Problem: Pain:  Goal: Pain level will decrease  Description: Pain level will decrease  9/7/2021 1109 by Cindi Coker RN  Outcome: Ongoing     Problem: Pain:  Goal: Control of acute pain  Description: Control of acute pain  9/7/2021 1109 by Cindi Coker RN  Outcome: Ongoing     Problem: Pain:  Goal: Control of chronic pain  Description: Control of chronic pain  9/7/2021 1109 by Cindi Coker RN  Outcome: Ongoing     Problem: Falls - Risk of:  Goal: Will remain free from falls  Description: Will remain free from falls  9/7/2021 1109 by Cindi Coker RN  Outcome: Met This Shift     Problem: Falls - Risk of:  Goal: Absence of physical injury  Description: Absence of physical injury  9/7/2021 1109 by Cindi Coker RN  Outcome: Met This Shift

## 2021-09-07 NOTE — CARE COORDINATION
9/7, SW spoke with Patpari Franklin from [facility] to discharge home today with [facility]. Equipment to be delivered later today between 2 and 5pm.  Patient to be picked up at [facility] by Ernesto Barclay to be transported home. SW to follow for further discharge planning needs.       [facility] Case Management  [phone]

## 2021-09-07 NOTE — CARE COORDINATION
9/7, Discharge acknowledged. Per [facility] RN note. Patient to discharge home with Hospice Care with [facility]. Patient to discharge home today with DME. Contact has been made with [facility]-awaiting to hear from them. SW to follow for further discharge planning needs.       Nafisa Aguilar SUSAN  [facility] Case Management  [phone]

## 2021-09-07 NOTE — PLAN OF CARE
Problem: Pain:  Goal: Pain level will decrease  Description: Pain level will decrease  9/7/2021 0029 by Sherri Mendes RN  Outcome: Ongoing  9/6/2021 1507 by Arjun Jesus RN  Outcome: Met This Shift  Goal: Control of acute pain  Description: Control of acute pain  9/7/2021 0029 by Sherri Mendes RN  Outcome: Ongoing  9/6/2021 1507 by Arjun Jesus RN  Outcome: Met This Shift  Goal: Control of chronic pain  Description: Control of chronic pain  9/7/2021 0029 by Sherri Mendes RN  Outcome: Ongoing  9/6/2021 1507 by Arjun Jesus RN  Outcome: Met This Shift     Problem: Falls - Risk of:  Goal: Will remain free from falls  Description: Will remain free from falls  9/7/2021 0029 by Sherri Mendes RN  Outcome: Met This Shift  9/6/2021 1507 by Arjun Jesus RN  Outcome: Met This Shift  Goal: Absence of physical injury  Description: Absence of physical injury  9/7/2021 0029 by Sherri Mendes RN  Outcome: Met This Shift  9/6/2021 1507 by Arjun Jesus RN  Outcome: Met This Shift

## 2021-09-07 NOTE — PROGRESS NOTES
Per Pharmacist Melba Felix ok to keep oral liquid Ativan in the refrigerator and no need to lock up . Terese Olszewski acting manager aware of this .

## 2021-09-07 NOTE — PROGRESS NOTES
Visit made to unit. Wife at the bedside. Answered all questions relating to hospice care in the home. DME will be delivered out today between 2-5pm. Wife would like [facility] medical directors to follow. Dr. Williams Gimenez on unit and will send comfort scripts to out patient pharmacy. [facility] ambulance will  at [address] face sheet and ambulance form to [facility]. Updated Eugenie, bedside RN and [place] notified and billing information sent to out patient pharmacy. Meds will be delivered to room prior to discharge. Call placed to [facility], [facility] medical director and provided patient information. Once patient is home and hospice consents have been signed, [facility] will follow. Updated [facility] intake department of discharge today.

## 2021-09-13 LAB
Lab: NORMAL
REPORT: NORMAL
THIS TEST SENT TO: NORMAL

## 2021-10-05 NOTE — DISCHARGE SUMMARY
Hospital Medicine Discharge Summary    Patient ID: Madi Field      Patient's PCP: Mrain Lua MD    Admit Date: 9/5/2021     Discharge Date: 9/7/2021      Admitting Physician: Alejandra Martinez MD     Discharge Physician: Eamon White MD     Discharge Diagnoses: Active Hospital Problems    Diagnosis Date Noted    Cancer, hepatocellular ([facility] [C22.0] 09/05/2021    Failure to thrive in adult [R62.7] 09/05/2021    DM2 (diabetes mellitus, type 2) ([facility] [E11.9] 09/05/2021    Depression [F32.9] 09/05/2021       The patient was seen and examined on day of discharge and this discharge summary is in conjunction with any daily progress note from day of discharge. Hospital Course:   Pt presented with decreasing PO intake, weight loss, generalized fatigue and malaise and inability to care for himself. He has a PMH significant for [facility]. followed at [facility] main (s/p radiation pellets and 2 rounds of chemo per pt). He has stopped his chemotherapy and all home medications about 1-2 months ago, and he states he no longer want to be treated for cancer and wishes to be placed in Hospice. Discharged in terminal condition to hospice on 9/7/21. Exam:     /60   Pulse 95   Temp 98 °F (36.7 °C) (Temporal)   Resp 18   Ht 6' (1.829 m)   Wt 236 lb 8 oz (107.3 kg)   SpO2 92%   BMI 32.08 kg/m²     General appearance: Middle aged male in no apparent distress, appears older than stated age, chronically ill, and frail. HEENT: Normal cephalic, atraumatic without obvious deformity. Pupils equal, round, and reactive to light.  Extra ocular muscles intact. Conjunctivae/corneas clear. Neck: Supple, with full range of motion. No jugular venous distention. Trachea midline. Respiratory: CTA. No distress. Cardiovascular: N S1/S2. No M,R,G  Abdomen: Soft, NTTP, + BS  Musculoskeletal: No clubbing, cyanosis, edema of bilateral lower extremities. Brisk capillary refill.    Skin: Normal skin color.  No rashes or lesions. Neurologic:  Neurovascularly intact without any focal sensory/motor deficits. Cranial nerves: II-XII intact, grossly non-focal.  Psychiatric: Alert and oriented, thought content appropriate, normal insight  Consults:     IP CONSULT TO INTERNAL MEDICINE  IP CONSULT TO PALLIATIVE CARE  IP CONSULT TO HOSPICE    Significant Diagnostic Studies:     CT ABDOMEN PELVIS W IV CONTRAST Additional Contrast? None   Final Result   Cirrhotic liver with multiple mass lesions concerning for multifocal   hepatocellular carcinoma. Metastatic disease thought much less likely. The   findings are overall likely slightly worsened compared to the prior MRI. Moderate ascites. Areas of small and large bowel thickening may be reactive to the fluid. Enterocolitis thought less likely. Thickening of gastric folds may be due to underdistention or gastritis. Borderline prominent lymph nodes in the hepatic hilum could be reactive or   due to metastatic disease. Trace bilateral pleural effusions with atelectasis in the lung bases. Moderate anasarca. Prominent diverticulosis. CT HEAD WO CONTRAST   Final Result   No acute intracranial abnormality. Specifically, there is no acute   intracranial hemorrhage. XR CHEST PORTABLE   Final Result   No acute process. Disposition:  Hospice facility     Discharge Instructions/Follow-up:    Keep scheduled follow up appointments. Take medications as prescribed. Code Status:  Prior     Activity: activity as tolerated    Diet: regular diet    Labs:  For convenience and continuity at follow-up the following most recent labs are provided:      CBC:    Lab Results   Component Value Date    WBC 8.6 09/05/2021    HGB 9.7 09/05/2021    HCT 28.8 09/05/2021    PLT 87 09/05/2021       Renal:    Lab Results   Component Value Date     09/05/2021    K 3.7 09/05/2021     09/05/2021    CO2 26 09/05/2021    BUN 27 09/05/2021 CREATININE 1.2 09/05/2021    CALCIUM 9.0 09/05/2021       Discharge Medications:     Discharge Medication List as of 9/7/2021  6:01 PM           Details   Morphine Sulfate (MORPHINE 20MG/ML) SOLN concentrated solution Take 0.25 mLs by mouth every 4 hours as needed for Pain for up to 20 days. , Disp-30 mL, R-0Normal      LORazepam (ATIVAN) 0.5 MG tablet Take 1 tablet by mouth every 6 hours as needed for Anxiety for up to 5 days. , Disp-20 tablet, R-0Normal      scopolamine (TRANSDERM-SCOP) transdermal patch Place 1 patch onto the skin every 72 hours, Disp-10 patch, R-0Normal      polyethylene glycol (GLYCOLAX) 17 g packet Take 17 g by mouth daily as needed for Constipation, Disp-527 g, R-1Normal              Details   sertraline (ZOLOFT) 100 MG tablet Take 100 mg by mouth dailyHistorical Med      pantoprazole sodium (PROTONIX) 40 MG PACK packet Take 40 mg by mouth every morning (before breakfast)Historical Med             Time Spent on discharge is more than 45 minutes in the examination, evaluation, counseling and review of medications and discharge plan.       Signed:    Michelle Oliva MD   10/5/2021

## 2021-10-15 NOTE — PROGRESS NOTES
Physician Progress Note      Rylee Zavala  CSN #:                  [id number]  :                       1960  ADMIT DATE:       2021 1:22 PM  Link Chamberlain DATE:        2021 5:59 PM  RESPONDING  PROVIDER #:        Sharon Kinsey MD          QUERY TEXT:    Dear Dr. Marlene Wang,    Patient admitted with Hepatobiliary carcinoma. Noted documentation of   depression in H&P. Progress note, and discharge summary throughout admission. Please document in progress notes and discharge summary if you are   treating/evaluating the following: The medical record reflects the following:  Risk Factors: Pt being out in Hospice care  Clinical Indicators: Active hepatobiliary carcinoma, Pt has stopped taking   medications not eating and has lost significant weight on the past 2 months,   pt has lost ability to do ADL's independently, wishes to cease all cancer   treatment and be placed under hospice care  Treatment: Pt discharged to Hospice    Thank you,  Carlyn WELLER, RN  Clinical Documentation Improvement  [email or web address]  Office #: [phone]  Options provided:  -- Major depression, recurrent:  severe without psychotic features  -- Major depression, recurrent: mild  -- Major depression, recurrent: moderate  -- Major depression, recurrent: severe with psychotic features  -- Other - I will add my own diagnosis  -- Disagree - Not applicable / Not valid  -- Disagree - Clinically unable to determine / Unknown  -- Refer to Clinical Documentation Reviewer    PROVIDER RESPONSE TEXT:    Provider is clinically unable to determine a response to this query.     Query created by: Lyndsey Galdamez on 10/15/2021 1:55 PM      Electronically signed by:  Sharon Kinsey MD 10/15/2021 4:37 PM